# Patient Record
Sex: FEMALE | Race: WHITE | NOT HISPANIC OR LATINO | Employment: UNEMPLOYED | URBAN - METROPOLITAN AREA
[De-identification: names, ages, dates, MRNs, and addresses within clinical notes are randomized per-mention and may not be internally consistent; named-entity substitution may affect disease eponyms.]

---

## 2017-07-20 ENCOUNTER — OFFICE VISIT (OUTPATIENT)
Dept: RHEUMATOLOGY | Facility: CLINIC | Age: 47
End: 2017-07-20
Payer: MEDICAID

## 2017-07-20 VITALS
WEIGHT: 225 LBS | DIASTOLIC BLOOD PRESSURE: 71 MMHG | SYSTOLIC BLOOD PRESSURE: 122 MMHG | BODY MASS INDEX: 44.17 KG/M2 | HEIGHT: 60 IN

## 2017-07-20 DIAGNOSIS — M79.7 FIBROMYALGIA: Chronic | ICD-10-CM

## 2017-07-20 PROCEDURE — 99214 OFFICE O/P EST MOD 30 MIN: CPT | Mod: ,,, | Performed by: INTERNAL MEDICINE

## 2017-07-20 RX ORDER — ZALEPLON 5 MG/1
CAPSULE ORAL
COMMUNITY
End: 2017-07-20

## 2017-07-20 RX ORDER — SIMVASTATIN 20 MG/1
TABLET, FILM COATED ORAL
COMMUNITY
End: 2021-05-05 | Stop reason: DRUGHIGH

## 2017-07-20 RX ORDER — FENOFIBRATE 200 MG/1
CAPSULE ORAL
COMMUNITY
End: 2017-11-28

## 2017-07-20 RX ORDER — INSULIN GLARGINE 300 [IU]/ML
INJECTION, SOLUTION SUBCUTANEOUS
COMMUNITY

## 2017-07-20 NOTE — PATIENT INSTRUCTIONS
4 Steps for Eating Healthier  Changing the way you eat can improve your health. It can lower your cholesterol and blood pressure, and help you stay at a healthy weight. Your diet doesnt have to be bland and boring to be healthy. Just watch your calories and follow these steps:    1. Eat fewer unhealthy fats  · Choose more fish and lean meats instead of fatty cuts of meat.  · Skip butter and lard, and use less margarine.  · Pass on foods that have palm, coconut, or hydrogenated oils.  · Eat fewer high-fat dairy foods like cheese, ice cream, and whole milk.  · Get a heart-healthy cookbook and try some low-fat recipes.  2. Go light on salt  · Keep the saltshaker off the table.  · Limit high-salt ingredients, such as soy sauce, bouillon, and garlic salt.  · Instead of adding salt when cooking, season your food with herbs and flavorings. Try lemon, garlic, and onion.  · Limit convenience foods, such as boxed or canned foods and restaurant food.  · Read food labels and choose lower-sodium options.  3. Limit sugar  · Pause before you add sugars to pancakes, cereal, coffee, or tea. This includes white and brown table sugar, syrup, honey, and molasses. Cut your usual amount by half.  · Use non-sugar sweeteners. Stevia, aspartame, and sucralose can satisfy a sweet tooth without adding calories.  · Swap out sugar-filled soda and other drinks. Buy sugar-free or low-calorie beverages. Remember water is always the best choice.  · Read labels and choose foods with less added sugar. Keep in mind that dairy foods and foods with fruit will have some natural sugar.  · Cut the sugar in recipes by 1/3 to 1/2. Boost the flavor with extracts like almond, vanilla, or orange. Or add spices such as cinnamon or nutmeg.  4. Eat more fiber  · Eat fresh fruits and vegetables every day.  · Boost your diet with whole grains. Go for oats, whole-grain rice, and bran.  · Add beans and lentils to your meals.  · Drink more water to match your fiber  increase. This is to help prevent constipation.  Date Last Reviewed: 5/11/2015  © 9095-3861 [email or web address] [address] [place] [address], [place] [address]. All rights reserved. This information is not intended as a substitute for professional medical care. Always follow your healthcare professional's instructions.

## 2017-07-20 NOTE — PROGRESS NOTES
[facility] RHEUMATOLOGY           Follow-up visit      Subjective:       Patient ID:   NAME: Arminda Cooper : 1970     47 y.o. female    Referring Doc: No ref. provider found  Other Physicians:    Chief Complaint:  Fibromyalgia (follow-up-- pt has had migraine X2 weeks,) and Pain (left pain knee, lower back pain, pt.had shooting pain under rib cage that was new)      HPI/Interval History:    The patient notes little change from her prior visit. She has migratory myalgias and arthralgias, most recently central low back pain and some left knee pain without antecedent trauma and without swelling or redness. She takes her sleep medication only occasionallyand admits that she does not feel well the morning after she does not take it. I noted that her weight had increased again and we therefore went into considerable depth on diet and exercise.          ROS:   GEN: no fever, night sweats or weight loss  SKIN:  no rashes , erythema, bruising, or swelling, no Raynauds, no photosensitivity  HEENT: no HAs, no changes in vision, no mouth ulcers, no sicca symptoms, no scalp tenderness, jaw claudication.  CV: no CP, SOB, PND, TORRES or orthopnea,no palpitations  PULM: no SOB, cough, hemoptysis, sputum or pleuritic pain  GI: no abdominal pain, nausea, vomiting, constipation, diarrhea, melanotic stools, BRBPR, or hematemesis.no dysphagia  : no hematuria, dysuria  NEURO:no paresthesias, headaches, visual disturbances, muscle weakness  MUSCULOSKELETAL:  Low back pain and intermittent left knee pain without history of joint swelling.  PSYCH: + insomnia, no significant depression, no anxiety    Medications:    Current Outpatient Prescriptions:     duloxetine (CYMBALTA) 30 MG capsule, Take 60 mg by mouth once daily. , Disp: , Rfl:     fenofibrate micronized (LOFIBRA) 200 MG Cap, Take by mouth., Disp: , Rfl:     glimepiride (AMARYL) 2 MG tablet, Take 2 mg by mouth before breakfast., Disp: , Rfl:     ibuprofen (ADVIL,MOTRIN)  800 MG tablet, Take 1 tablet (800 mg total) by mouth 3 (three) times daily as needed., Disp: 20 tablet, Rfl: 0    insulin glargine, TOUJEO, (TOUJEO SOLOSTAR) 300 unit/mL (1.5 mL) InPn pen, Inject into the skin., Disp: , Rfl:     levothyroxine (SYNTHROID) 75 MCG tablet, Take 115 mcg by mouth once daily. , Disp: , Rfl:     LIRAGLUTIDE (VICTOZA 2-SOFÍA SUBQ), Inject 1.2 mg into the skin once daily., Disp: , Rfl:     prednisoLONE acetate (PRED FORTE) 1 % DrpS, 1 drop 4 (four) times daily., Disp: , Rfl:     simvastatin (ZOCOR) 20 MG tablet, Take by mouth., Disp: , Rfl:   FAMILY HISTORY: negative for Connective Tissue Disease        Review of patient's allergies indicates:  No Known Allergies          Objective:     Vitals:  Blood pressure 122/71, height 5' (1.524 m), weight 102.1 kg (225 lb).    Physical Examination:   GEN: wn/wd female in no apparent distress; AAOx3  SKIN: no rashes, no lesions, no sclerodactyly, no Raynaud's, no periungual erythema  HEAD: no alopecia, no scalp tenderness, no temporal artery tenderness or induration.  EYES: no pallor, no icterus, PERRLA  ENT:  no thrush, no mucosal dryness or ulcerations, adequate oral hygiene & dentition.  NECK: supple x 6, no masses, no thyromegaly, no lymphadenopathy.  CV:   S1 and S2 regular, no murmurs, gallop or rubs  CHEST: Normal respiratory effort;  normal breath sounds/no adventitious sounds. No signs of consolidation.  ABD: non-tender and non-distended; soft; normal bowel sounds; no rebound/guarding or tenderness. No hepatosplenomegaly.  Musculoskeletal:  no evidence of any significant degenerative arthritis or deformity.  EXTREM: no clubbing, cyanosis or edema. normal pulses.  NEURO: grossly intact; motor/sensory WNL; AAOx3; no tremors  PSYCH: normal mood, affect and behavior            Labs:   Lab Results   Component Value Date    WBC 10.80 06/24/2015    HGB 14.6 06/24/2015    HCT 43.3 06/24/2015    MCV 93 06/24/2015     06/24/2015   CMP@  Sodium    Date Value Ref Range Status   06/24/2015 139 136 - 145 mmol/L Final     Potassium   Date Value Ref Range Status   06/24/2015 3.8 3.5 - 5.1 mmol/L Final     Chloride   Date Value Ref Range Status   06/24/2015 106 95 - 110 mmol/L Final     CO2   Date Value Ref Range Status   06/24/2015 24 23 - 29 mmol/L Final     Glucose   Date Value Ref Range Status   06/24/2015 186 (H) 70 - 110 mg/dL Final     BUN, Bld   Date Value Ref Range Status   06/24/2015 8 6 - 20 mg/dL Final     Creatinine   Date Value Ref Range Status   06/24/2015 0.7 0.5 - 1.4 mg/dL Final     Calcium   Date Value Ref Range Status   06/24/2015 9.4 8.7 - 10.5 mg/dL Final     Total Protein   Date Value Ref Range Status   06/24/2015 7.0 6.0 - 8.4 g/dL Final     Albumin   Date Value Ref Range Status   06/24/2015 3.6 3.5 - 5.2 g/dL Final     Total Bilirubin   Date Value Ref Range Status   06/24/2015 0.4 0.1 - 1.0 mg/dL Final     Comment:     For infants and newborns, interpretation of results should be based  on gestational age, weight and in agreement with clinical  observations.  Premature Infant recommended reference ranges:  Up to 24 hours.............<8.0 mg/dL  Up to 48 hours............<12.0 mg/dL  3-5 days..................<15.0 mg/dL  6-29 days.................<15.0 mg/dL       Alkaline Phosphatase   Date Value Ref Range Status   06/24/2015 57 55 - 135 U/L Final     AST   Date Value Ref Range Status   06/24/2015 19 10 - 40 U/L Final     ALT   Date Value Ref Range Status   06/24/2015 15 10 - 44 U/L Final         Radiology/Diagnostic Studies:    No x-rays were available to me.    Assessment/Discussion/Plan:   47 y.o. female with Fibromyalgia aggravated by intermittent noncompliance, morbid obesity and deconditioning.        PLAN:  We will continue her medication without change. She is to use her Sonata every night. We went into great depth on diet and weight loss along with exercise. I have discussed with her how to count calories and instructed her to  limit her caloric intake to 1200 ashely per day. In addition, I have asked her to begin some low impact exercise 5 minutes daily, 5 days per week I suggested a stationary bicycle. She is then to increase the duration of her workouts by 50% each month. I have asked her that she shoot for a 4 ounce weekly weight loss or 1 pound monthly. This of course translates to 12 pounds per year and in 5 years, 60 pounds! I made it known that her weight at the next visit will be important to me.      RTC: I will see her back in 3-4 months.      Electronically signed by Rashi Eng MD

## 2017-11-28 ENCOUNTER — OFFICE VISIT (OUTPATIENT)
Dept: RHEUMATOLOGY | Facility: CLINIC | Age: 47
End: 2017-11-28
Payer: MEDICAID

## 2017-11-28 VITALS
WEIGHT: 217 LBS | DIASTOLIC BLOOD PRESSURE: 78 MMHG | HEIGHT: 60 IN | SYSTOLIC BLOOD PRESSURE: 128 MMHG | BODY MASS INDEX: 42.6 KG/M2

## 2017-11-28 DIAGNOSIS — M79.7 FIBROMYALGIA: Primary | ICD-10-CM

## 2017-11-28 PROCEDURE — 99212 OFFICE O/P EST SF 10 MIN: CPT | Mod: ,,, | Performed by: INTERNAL MEDICINE

## 2017-11-28 RX ORDER — DULOXETIN HYDROCHLORIDE 30 MG/1
30 CAPSULE, DELAYED RELEASE ORAL DAILY
Qty: 90 CAPSULE | Refills: 1 | Status: SHIPPED | OUTPATIENT
Start: 2017-11-28 | End: 2018-06-05 | Stop reason: SDUPTHER

## 2017-11-28 NOTE — PROGRESS NOTES
[facility] RHEUMATOLOGY           Follow-up visit      Subjective:       Patient ID:   NAME: Arminda Cooper : 1970     47 y.o. female    Referring Doc: No ref. provider found  Other Physicians:    Chief Complaint:  No chief complaint on file.      HPI/Interval History:    The patient is doing well. She has been exercising and has lost weight. She is sleeping well without medication.          ROS:   GEN:    no fever, night sweats or weight loss  SKIN:   no rashes , erythema, bruising, or swelling, no Raynauds, no photosensitivity  HEENT: no HAs, no changes in vision, no mouth ulcers, no sicca symptoms, no scalp tenderness, jaw claudication.  CV:      no CP, SOB, PND, TORRES or orthopnea,no palpitations  PULM: no SOB, cough, hemoptysis, sputum or pleuritic pain  GI:      no abdominal pain, nausea, vomiting, constipation, diarrhea, melanotic stools, BRBPR, or hematemesis, no dysphagia, no GERD  :     no hematuria, dysuria  NEURO: no paresthesias, headaches, visual disturbances  MUSCULOSKELETAL:  no muscle or joint complaints  PSYCH:   No insomnia, no significant depression, no anxiety    Medications:    Current Outpatient Prescriptions:     DULoxetine (CYMBALTA) 30 MG capsule, Take 1 capsule (30 mg total) by mouth once daily., Disp: 90 capsule, Rfl: 1    glimepiride (AMARYL) 2 MG tablet, Take 2 mg by mouth before breakfast., Disp: , Rfl:     insulin glargine, TOUJEO, (TOUJEO SOLOSTAR) 300 unit/mL (1.5 mL) InPn pen, Inject into the skin., Disp: , Rfl:     levothyroxine (SYNTHROID) 75 MCG tablet, Take 115 mcg by mouth once daily. , Disp: , Rfl:     LIRAGLUTIDE (VICTOZA 2-SOFÍA SUBQ), Inject 1.2 mg into the skin once daily., Disp: , Rfl:     simvastatin (ZOCOR) 20 MG tablet, Take by mouth., Disp: , Rfl:       FAMILY HISTORY: negative for Connective Tissue Disease        Review of patient's allergies indicates:  No Known Allergies          Objective:     Vitals:  Blood pressure 128/78, height 5' (1.524 m),  weight 98.4 kg (217 lb).    Physical Examination:   GEN: wn/wd female in no apparent distress  SKIN: no rashes, no lesions, no sclerodactyly, no Raynaud's, no periungual erythema  HEAD: no alopecia, no scalp tenderness, no temporal artery tenderness or induration.  EYES: no pallor, no icterus, PERRLA  ENT:  no thrush, no mucosal dryness or ulcerations, adequate oral hygiene & dentition.  NECK: supple x 6, no masses, no thyromegaly, no lymphadenopathy.  CV:   S1 and S2 regular, no murmurs, gallop or rubs  CHEST: Normal respiratory effort;  normal breath sounds/no adventitious sounds. No signs of consolidation.  ABD: non-tender and non-distended; soft; normal bowel sounds; no rebound/guarding or tenderness. No hepatosplenomegaly.  Musculoskeletal:  No evidence of progressive arthritis  EXTREM: no clubbing, cyanosis or edema. normal pulses.  NEURO: grossly intact; motor/sensory WNL; AAOx3; no tremors  PSYCH:  normal mood, affect and behavior            Labs:   Lab Results   Component Value Date    WBC 10.80 06/24/2015    HGB 14.6 06/24/2015    HCT 43.3 06/24/2015    MCV 93 06/24/2015     06/24/2015   CMP@  Sodium   Date Value Ref Range Status   06/24/2015 139 136 - 145 mmol/L Final     Potassium   Date Value Ref Range Status   06/24/2015 3.8 3.5 - 5.1 mmol/L Final     Chloride   Date Value Ref Range Status   06/24/2015 106 95 - 110 mmol/L Final     CO2   Date Value Ref Range Status   06/24/2015 24 23 - 29 mmol/L Final     Glucose   Date Value Ref Range Status   06/24/2015 186 (H) 70 - 110 mg/dL Final     BUN, Bld   Date Value Ref Range Status   06/24/2015 8 6 - 20 mg/dL Final     Creatinine   Date Value Ref Range Status   06/24/2015 0.7 0.5 - 1.4 mg/dL Final     Calcium   Date Value Ref Range Status   06/24/2015 9.4 8.7 - 10.5 mg/dL Final     Total Protein   Date Value Ref Range Status   06/24/2015 7.0 6.0 - 8.4 g/dL Final     Albumin   Date Value Ref Range Status   06/24/2015 3.6 3.5 - 5.2 g/dL Final     Total  Bilirubin   Date Value Ref Range Status   06/24/2015 0.4 0.1 - 1.0 mg/dL Final     Comment:     For infants and newborns, interpretation of results should be based  on gestational age, weight and in agreement with clinical  observations.  Premature Infant recommended reference ranges:  Up to 24 hours.............<8.0 mg/dL  Up to 48 hours............<12.0 mg/dL  3-5 days..................<15.0 mg/dL  6-29 days.................<15.0 mg/dL       Alkaline Phosphatase   Date Value Ref Range Status   06/24/2015 57 55 - 135 U/L Final     AST   Date Value Ref Range Status   06/24/2015 19 10 - 40 U/L Final     ALT   Date Value Ref Range Status   06/24/2015 15 10 - 44 U/L Final         Radiology/Diagnostic Studies:    none    Assessment/Discussion/Plan:   47 y.o. female with fibromyalgia-well controlled on Cymbalta 30 mg alone.      PLAN:  I will continue her medication without change. No blood testing is required today.      RTC:  I will see her back in 4 months.      Electronically signed by Rashi Eng MD

## 2018-04-16 ENCOUNTER — OFFICE VISIT (OUTPATIENT)
Dept: RHEUMATOLOGY | Facility: CLINIC | Age: 48
End: 2018-04-16
Payer: MEDICAID

## 2018-04-16 VITALS
WEIGHT: 220.88 LBS | DIASTOLIC BLOOD PRESSURE: 82 MMHG | BODY MASS INDEX: 43.14 KG/M2 | SYSTOLIC BLOOD PRESSURE: 122 MMHG

## 2018-04-16 DIAGNOSIS — M79.7 FIBROMYALGIA: Primary | ICD-10-CM

## 2018-04-16 PROCEDURE — 99212 OFFICE O/P EST SF 10 MIN: CPT | Mod: ,,, | Performed by: INTERNAL MEDICINE

## 2018-04-16 NOTE — PROGRESS NOTES
[facility] RHEUMATOLOGY           Follow-up visit      Subjective:       Patient ID:   NAME: Arminda Cooper : 1970     47 y.o. female    Referring Doc: No ref. provider found  Other Physicians:    Chief Complaint:  Fibromyalgia (Takes Cymbalta 30mg QD, Left Hip is bothering her today, Right Shoulder stiff)      HPI/Interval History:   The patient has been doing better. She has been walking regularly. Her energy is improved. She is most often sleeping well.          ROS:   GEN:    no fever, night sweats or weight loss  SKIN:   no rashes , erythema, bruising, or swelling, no Raynauds, no photosensitivity  HEENT: no HAs, no changes in vision, no mouth ulcers, no sicca symptoms, no scalp tenderness, jaw claudication.  CV:      no CP, SOB, PND, TORRES or orthopnea,no palpitations  PULM: no SOB, cough, hemoptysis, sputum or pleuritic pain  GI:      no abdominal pain, nausea, vomiting, constipation, diarrhea, melanotic stools, BRBPR, or hematemesis, no dysphagia, no GERD  :     no hematuria, dysuria  NEURO: no paresthesias, headaches, visual disturbances  MUSCULOSKELETAL:  No muscle or joint complaints  PSYCH:   No insomnia, no significant depression, no anxiety    Medications:    Current Outpatient Prescriptions:     DULoxetine (CYMBALTA) 30 MG capsule, Take 1 capsule (30 mg total) by mouth once daily., Disp: 90 capsule, Rfl: 1    glimepiride (AMARYL) 2 MG tablet, Take 2 mg by mouth before breakfast., Disp: , Rfl:     insulin glargine, TOUJEO, (TOUJEO SOLOSTAR) 300 unit/mL (1.5 mL) InPn pen, Inject into the skin., Disp: , Rfl:     levothyroxine (SYNTHROID) 75 MCG tablet, Take 115 mcg by mouth once daily. , Disp: , Rfl:     LIRAGLUTIDE (VICTOZA 2-SOFÍA SUBQ), Inject 1.2 mg into the skin once daily., Disp: , Rfl:     simvastatin (ZOCOR) 20 MG tablet, Take by mouth., Disp: , Rfl:       FAMILY HISTORY: negative for Connective Tissue Disease        Review of patient's allergies indicates:  No Known  Allergies          Objective:     Vitals:  Blood pressure 122/82, weight 100.2 kg (220 lb 14.4 oz).    Physical Examination:   GEN: wn/wd female in no apparent distress  SKIN: no rashes, no lesions, no sclerodactyly, no Raynaud's, no periungual erythema  HEAD: no alopecia, no scalp tenderness, no temporal artery tenderness or induration.  EYES: no pallor, no icterus, PERRLA  ENT:  no thrush, no mucosal dryness or ulcerations, adequate oral hygiene & dentition.  NECK: supple x 6, no masses, no thyromegaly, no lymphadenopathy.  CV:   S1 and S2 regular, no murmurs, gallop or rubs  CHEST: Normal respiratory effort;  normal breath sounds/no adventitious sounds. No signs of consolidation.  ABD: non-tender and non-distended; soft; normal bowel sounds; no rebound/guarding or tenderness. No hepatosplenomegaly.  Musculoskeletal:  No evidence of inflammatory disease or a progressive deformity  EXTREM: no clubbing, cyanosis or edema. normal pulses.  NEURO: grossly intact; motor/sensory WNL; AAOx3; no tremors  PSYCH:  normal mood, affect and behavior            Labs:   Lab Results   Component Value Date    WBC 10.80 06/24/2015    HGB 14.6 06/24/2015    HCT 43.3 06/24/2015    MCV 93 06/24/2015     06/24/2015   CMP@  Sodium   Date Value Ref Range Status   06/24/2015 139 136 - 145 mmol/L Final     Potassium   Date Value Ref Range Status   06/24/2015 3.8 3.5 - 5.1 mmol/L Final     Chloride   Date Value Ref Range Status   06/24/2015 106 95 - 110 mmol/L Final     CO2   Date Value Ref Range Status   06/24/2015 24 23 - 29 mmol/L Final     Glucose   Date Value Ref Range Status   06/24/2015 186 (H) 70 - 110 mg/dL Final     BUN, Bld   Date Value Ref Range Status   06/24/2015 8 6 - 20 mg/dL Final     Creatinine   Date Value Ref Range Status   06/24/2015 0.7 0.5 - 1.4 mg/dL Final     Calcium   Date Value Ref Range Status   06/24/2015 9.4 8.7 - 10.5 mg/dL Final     Total Protein   Date Value Ref Range Status   06/24/2015 7.0 6.0 - 8.4  g/dL Final     Albumin   Date Value Ref Range Status   06/24/2015 3.6 3.5 - 5.2 g/dL Final     Total Bilirubin   Date Value Ref Range Status   06/24/2015 0.4 0.1 - 1.0 mg/dL Final     Comment:     For infants and newborns, interpretation of results should be based  on gestational age, weight and in agreement with clinical  observations.  Premature Infant recommended reference ranges:  Up to 24 hours.............<8.0 mg/dL  Up to 48 hours............<12.0 mg/dL  3-5 days..................<15.0 mg/dL  6-29 days.................<15.0 mg/dL       Alkaline Phosphatase   Date Value Ref Range Status   06/24/2015 57 55 - 135 U/L Final     AST   Date Value Ref Range Status   06/24/2015 19 10 - 40 U/L Final     ALT   Date Value Ref Range Status   06/24/2015 15 10 - 44 U/L Final         Radiology/Diagnostic Studies:    none    Assessment/Discussion/Plan:   47 y.o. female with fibromyalgia-stable      PLAN:  She will continue her medication without change. No blood testing was required today.      RTC:  I will see her back in 3-4 months.      Electronically signed by Rashi Eng MD

## 2018-05-16 ENCOUNTER — OFFICE VISIT (OUTPATIENT)
Dept: RHEUMATOLOGY | Facility: CLINIC | Age: 48
End: 2018-05-16
Payer: MEDICAID

## 2018-05-16 VITALS
DIASTOLIC BLOOD PRESSURE: 79 MMHG | BODY MASS INDEX: 43.71 KG/M2 | SYSTOLIC BLOOD PRESSURE: 124 MMHG | WEIGHT: 223.81 LBS

## 2018-05-16 DIAGNOSIS — M79.7 FIBROMYALGIA: Primary | ICD-10-CM

## 2018-05-16 PROCEDURE — 99212 OFFICE O/P EST SF 10 MIN: CPT | Mod: ,,, | Performed by: INTERNAL MEDICINE

## 2018-05-16 NOTE — PROGRESS NOTES
[facility] RHEUMATOLOGY           Follow-up visit    Notes dictated via Dragon to EPIC. Please forgive any unintentional errors.  Subjective:       Patient ID:   NAME: Arminda Cooper : 1970     47 y.o. female    Referring Doc: No ref. provider found  Other Physicians:    Chief Complaint:  Fibromyalgia      HPI/Interval History:  The patient is doing well. Her moods are much improved and she credits the Cymbalta for that. She sleeps relatively well, especially on nights that she takes melatonin.  She is not exercising as aggressively as I would like but she states that she is making an effort.        ROS:   GEN:    no fever, night sweats or weight loss  SKIN:   no rashes , erythema, bruising, or swelling, no Raynauds, no photosensitivity  HEENT: no changes in vision, no mouth ulcers, no sicca symptoms, no scalp tenderness, no jaw claudication.  CV:      no CP, PND, TORRES or orthopnea, no palpitations  PULM: no SOB, cough, hemoptysis, sputum or pleuritic pain  GI:       no abdominal pain, nausea, vomiting, constipation, diarrhea, melanotic stools, BRBPR, or hematemesis, no dysphagia, no GERD  :     no hematuria, dysuria  NEURO: no paresthesias, headaches, acute visual disturbances  MUSCULOSKELETAL:  Pain in the right trapezius only  PSYCH:   No insomnia, no significant anxiety or depression    Medications:    Current Outpatient Prescriptions:     DULoxetine (CYMBALTA) 30 MG capsule, Take 1 capsule (30 mg total) by mouth once daily., Disp: 90 capsule, Rfl: 1    glimepiride (AMARYL) 2 MG tablet, Take 2 mg by mouth before breakfast., Disp: , Rfl:     insulin glargine, TOUJEO, (TOUJEO SOLOSTAR) 300 unit/mL (1.5 mL) InPn pen, Inject into the skin., Disp: , Rfl:     levothyroxine (SYNTHROID) 75 MCG tablet, Take 115 mcg by mouth once daily. , Disp: , Rfl:     LIRAGLUTIDE (VICTOZA 2-SOFÍA SUBQ), Inject 1.2 mg into the skin once daily., Disp: , Rfl:     simvastatin (ZOCOR) 20 MG tablet, Take by mouth., Disp: ,  Rfl:       FAMILY HISTORY: negative for Connective Tissue Disease        Review of patient's allergies indicates:   Allergen Reactions    Augmentin [amoxicillin-pot clavulanate] Rash             Objective:     Vitals:  Blood pressure 124/79, weight 101.5 kg (223 lb 12.8 oz).    Physical Examination:   GEN: wn/wd female in no apparent distress  SKIN: no rashes, no lesions, no sclerodactyly, no Raynaud's, no periungual erythema  HEAD: no alopecia, no scalp tenderness, no temporal artery tenderness or induration.  EYES: no pallor, no icterus, PERRLA  ENT:  no thrush, no mucosal dryness or ulcerations, adequate oral hygiene & dentition.  NECK:  Moderate spasm of the right trapezius with tenderness at the occipital insertion, no masses, no thyromegaly, no lymphadenopathy.  CV:   S1 and S2 regular, no murmurs, gallop or rubs  CHEST: Normal respiratory effort;  normal breath sounds/no adventitious sounds. No signs of consolidation.  ABD: non-tender and non-distended; soft; normal bowel sounds; no rebound/guarding or tenderness. No hepatosplenomegaly.  Musculoskeletal:  No evidence of active inflammatory disease or of any progression of deformity  EXTREM: no clubbing, cyanosis or edema. normal pulses.  NEURO: grossly intact; motor/sensory WNL; no tremors  PSYCH:  normal mood, affect and behavior            Labs:   Lab Results   Component Value Date    WBC 10.80 06/24/2015    HGB 14.6 06/24/2015    HCT 43.3 06/24/2015    MCV 93 06/24/2015     06/24/2015   CMP@  Sodium   Date Value Ref Range Status   06/24/2015 139 136 - 145 mmol/L Final     Potassium   Date Value Ref Range Status   06/24/2015 3.8 3.5 - 5.1 mmol/L Final     Chloride   Date Value Ref Range Status   06/24/2015 106 95 - 110 mmol/L Final     CO2   Date Value Ref Range Status   06/24/2015 24 23 - 29 mmol/L Final     Glucose   Date Value Ref Range Status   06/24/2015 186 (H) 70 - 110 mg/dL Final     BUN, Bld   Date Value Ref Range Status   06/24/2015 8 6 -  20 mg/dL Final     Creatinine   Date Value Ref Range Status   06/24/2015 0.7 0.5 - 1.4 mg/dL Final     Calcium   Date Value Ref Range Status   06/24/2015 9.4 8.7 - 10.5 mg/dL Final     Total Protein   Date Value Ref Range Status   06/24/2015 7.0 6.0 - 8.4 g/dL Final     Albumin   Date Value Ref Range Status   06/24/2015 3.6 3.5 - 5.2 g/dL Final     Total Bilirubin   Date Value Ref Range Status   06/24/2015 0.4 0.1 - 1.0 mg/dL Final     Comment:     For infants and newborns, interpretation of results should be based  on gestational age, weight and in agreement with clinical  observations.  Premature Infant recommended reference ranges:  Up to 24 hours.............<8.0 mg/dL  Up to 48 hours............<12.0 mg/dL  3-5 days..................<15.0 mg/dL  6-29 days.................<15.0 mg/dL       Alkaline Phosphatase   Date Value Ref Range Status   06/24/2015 57 55 - 135 U/L Final     AST   Date Value Ref Range Status   06/24/2015 19 10 - 40 U/L Final     ALT   Date Value Ref Range Status   06/24/2015 15 10 - 44 U/L Final         Radiology/Diagnostic Studies:    none    Assessment/Discussion/Plan:   47 y.o. female with fibromyalgia-stable on Cymbalta 30 mg      PLAN:  I will continue her medication without change. I have encouraged her to become more active in exercising. Weight loss is also in need which I will address at our next appointment      RTC:  I will see her back in 3-4 months      Electronically signed by Rashi Eng MD

## 2018-06-05 DIAGNOSIS — M79.7 FIBROMYALGIA: ICD-10-CM

## 2018-06-05 RX ORDER — DULOXETIN HYDROCHLORIDE 30 MG/1
CAPSULE, DELAYED RELEASE ORAL
Qty: 30 CAPSULE | Refills: 5 | Status: SHIPPED | OUTPATIENT
Start: 2018-06-05 | End: 2018-10-02

## 2018-10-02 ENCOUNTER — OFFICE VISIT (OUTPATIENT)
Dept: RHEUMATOLOGY | Facility: CLINIC | Age: 48
End: 2018-10-02
Payer: MEDICAID

## 2018-10-02 VITALS
SYSTOLIC BLOOD PRESSURE: 120 MMHG | HEART RATE: 108 BPM | WEIGHT: 221.81 LBS | BODY MASS INDEX: 43.32 KG/M2 | DIASTOLIC BLOOD PRESSURE: 80 MMHG

## 2018-10-02 DIAGNOSIS — E13.8 DIABETES MELLITUS OF OTHER TYPE WITH COMPLICATION, UNSPECIFIED WHETHER LONG TERM INSULIN USE: ICD-10-CM

## 2018-10-02 DIAGNOSIS — Z79.899 ENCOUNTER FOR LONG-TERM (CURRENT) DRUG USE: ICD-10-CM

## 2018-10-02 DIAGNOSIS — E03.9 HYPOTHYROIDISM, UNSPECIFIED TYPE: ICD-10-CM

## 2018-10-02 DIAGNOSIS — M79.7 FIBROMYALGIA: Primary | ICD-10-CM

## 2018-10-02 DIAGNOSIS — E78.5 HYPERLIPIDEMIA, UNSPECIFIED HYPERLIPIDEMIA TYPE: ICD-10-CM

## 2018-10-02 PROCEDURE — 99214 OFFICE O/P EST MOD 30 MIN: CPT | Mod: ,,, | Performed by: INTERNAL MEDICINE

## 2018-10-02 RX ORDER — DULOXETIN HYDROCHLORIDE 60 MG/1
60 CAPSULE, DELAYED RELEASE ORAL DAILY
Qty: 30 CAPSULE | Refills: 11 | Status: SHIPPED | OUTPATIENT
Start: 2018-10-02 | End: 2019-11-05 | Stop reason: SDUPTHER

## 2018-10-02 NOTE — PROGRESS NOTES
[facility] RHEUMATOLOGY           Follow-up visit    Notes dictated via Dragon to EPIC. Please forgive any unintentional errors.  Subjective:       Patient ID:   NAME: Arminda Cooper : 1970     48 y.o. female    Referring Doc: No ref. provider found  Other Physicians:    Chief Complaint:  Fibromyalgia (Takes Cymbalta 30mg QD(Needs Refill). Upper Back and Shoulders Pain)      HPI/Interval History:   The patient is having increased pain in the shoulders and upper back. She attributes this to her new exercise routine. She notes that she is not sleeping well. She is not taking sleep medication I have given her. She is uncertain why she isn't taking it but she does have it at home and states that she simply forgot. She also relates to daytime sleepiness which typically takes in around noon and last throughout the afternoon          ROS:   GEN:    no fever, night sweats or weight loss  SKIN:   no rashes , erythema, bruising, or swelling, no Raynauds, no photosensitivity  HEENT: no changes in vision, no mouth ulcers, no sicca symptoms, no scalp tenderness, no jaw claudication.  CV:      no CP, PND, TORRES or orthopnea, no palpitations  PULM: no SOB, cough, hemoptysis, sputum or pleuritic pain  GI:       no abdominal pain, nausea, vomiting, constipation, diarrhea, melanotic stools, BRBPR, or hematemesis, no dysphagia, no GERD  :     no hematuria, dysuria  NEURO: no paresthesias, headaches, acute visual disturbances  MUSCULOSKELETAL:  Myalgias of the shoulders and neck  PSYCH:   + insomnia, + anxiety > depression    Medications:    Current Outpatient Medications:     glimepiride (AMARYL) 2 MG tablet, Take 2 mg by mouth before breakfast., Disp: , Rfl:     insulin glargine, TOUJEO, (TOUJEO SOLOSTAR) 300 unit/mL (1.5 mL) InPn pen, Inject into the skin., Disp: , Rfl:     levothyroxine (SYNTHROID) 75 MCG tablet, Take 115 mcg by mouth once daily. , Disp: , Rfl:     LIRAGLUTIDE (VICTOZA 2-SOFÍA SUBQ), Inject 1.2 mg into  the skin once daily., Disp: , Rfl:     simvastatin (ZOCOR) 20 MG tablet, Take by mouth., Disp: , Rfl:     DULoxetine (CYMBALTA) 60 MG capsule, Take 1 capsule (60 mg total) by mouth once daily., Disp: 30 capsule, Rfl: 11      FAMILY HISTORY: negative for Connective Tissue Disease        Review of patient's allergies indicates:   Allergen Reactions    Augmentin [amoxicillin-pot clavulanate] Rash             Objective:     Vitals:  Blood pressure 120/80, pulse 108, weight 100.6 kg (221 lb 12.8 oz).    Physical Examination:   GEN: wn/wd female in no apparent distress  SKIN: no rashes, no lesions, no sclerodactyly, no Raynaud's, no periungual erythema  HEAD: no alopecia, no scalp tenderness, no temporal artery tenderness or induration.  EYES: no pallor, no icterus, PERRLA  ENT:  no thrush, no mucosal dryness or ulcerations, adequate oral hygiene & dentition.  NECK: supple x 6, no masses, no thyromegaly, no lymphadenopathy.  CV:   S1 and S2 regular, no murmurs, gallop or rubs  CHEST: Normal respiratory effort;  normal breath sounds/no adventitious sounds. No signs of consolidation.  ABD: non-tender and non-distended; soft; normal bowel sounds; no rebound/guarding or tenderness. No hepatosplenomegaly.  Musculoskeletal:  No evidence of inflammatory arthritis. No progressive deformity  EXTREM: no clubbing, cyanosis or edema. normal pulses.  NEURO: grossly intact; motor/sensory WNL; no tremors  PSYCH:  normal mood, affect and behavior            Labs:   Lab Results   Component Value Date    WBC 10.80 06/24/2015    HGB 14.6 06/24/2015    HCT 43.3 06/24/2015    MCV 93 06/24/2015     06/24/2015   CMP@  Sodium   Date Value Ref Range Status   06/24/2015 139 136 - 145 mmol/L Final     Potassium   Date Value Ref Range Status   06/24/2015 3.8 3.5 - 5.1 mmol/L Final     Chloride   Date Value Ref Range Status   06/24/2015 106 95 - 110 mmol/L Final     CO2   Date Value Ref Range Status   06/24/2015 24 23 - 29 mmol/L Final      Glucose   Date Value Ref Range Status   06/24/2015 186 (H) 70 - 110 mg/dL Final     BUN, Bld   Date Value Ref Range Status   06/24/2015 8 6 - 20 mg/dL Final     Creatinine   Date Value Ref Range Status   06/24/2015 0.7 0.5 - 1.4 mg/dL Final     Calcium   Date Value Ref Range Status   06/24/2015 9.4 8.7 - 10.5 mg/dL Final     Total Protein   Date Value Ref Range Status   06/24/2015 7.0 6.0 - 8.4 g/dL Final     Albumin   Date Value Ref Range Status   06/24/2015 3.6 3.5 - 5.2 g/dL Final     Total Bilirubin   Date Value Ref Range Status   06/24/2015 0.4 0.1 - 1.0 mg/dL Final     Comment:     For infants and newborns, interpretation of results should be based  on gestational age, weight and in agreement with clinical  observations.  Premature Infant recommended reference ranges:  Up to 24 hours.............<8.0 mg/dL  Up to 48 hours............<12.0 mg/dL  3-5 days..................<15.0 mg/dL  6-29 days.................<15.0 mg/dL       Alkaline Phosphatase   Date Value Ref Range Status   06/24/2015 57 55 - 135 U/L Final     AST   Date Value Ref Range Status   06/24/2015 19 10 - 40 U/L Final     ALT   Date Value Ref Range Status   06/24/2015 15 10 - 44 U/L Final         Radiology/Diagnostic Studies:    none    Assessment/Discussion/Plan:   48 y.o. female with fibromyalgia-inadequate response to only partial therapy  2) diabetes, hyperlipidemia and hypothyroidism- overdue for labs    PLAN:  I'm going to increase her Cymbalta dose to 60 mg each morning. She will get some benefit from that vis-à-vis pain relief and she should get some decrease in fatigue as well. I have encouraged her to continue exercising. I also reminded her about the 1200-calorie diet that we had set up.  I have drawn baseline blood testing for her general medical complaints. These I will forward to Dr. Roberts.    RTC:  I will see her back in 2-3 months.      Electronically signed by Rashi Eng MD

## 2018-10-03 LAB
ALBUMIN SERPL-MCNC: 4.5 G/DL (ref 3.5–5.5)
ALBUMIN/GLOB SERPL: 1.6 {RATIO} (ref 1.2–2.2)
ALP SERPL-CCNC: 54 IU/L (ref 39–117)
ALT SERPL-CCNC: 17 IU/L (ref 0–32)
AST SERPL-CCNC: 21 IU/L (ref 0–40)
BASOPHILS # BLD AUTO: 0 X10E3/UL (ref 0–0.2)
BASOPHILS NFR BLD AUTO: 0 %
BILIRUB SERPL-MCNC: 0.4 MG/DL (ref 0–1.2)
BUN SERPL-MCNC: 10 MG/DL (ref 6–24)
BUN/CREAT SERPL: 15 (ref 9–23)
CALCIUM SERPL-MCNC: 9.5 MG/DL (ref 8.7–10.2)
CHLORIDE SERPL-SCNC: 99 MMOL/L (ref 96–106)
CHOLEST SERPL-MCNC: 224 MG/DL (ref 100–199)
CO2 SERPL-SCNC: 24 MMOL/L (ref 20–29)
CREAT SERPL-MCNC: 0.68 MG/DL (ref 0.57–1)
CRP SERPL-MCNC: 2.4 MG/L (ref 0–4.9)
EGFR IF AFRICAN AMERICAN: 119 ML/MIN/1.73
EOSINOPHIL # BLD AUTO: 0.4 X10E3/UL (ref 0–0.4)
EOSINOPHIL NFR BLD AUTO: 5 %
ERYTHROCYTE [DISTWIDTH] IN BLOOD BY AUTOMATED COUNT: 13.5 % (ref 12.3–15.4)
EST. GFR  (NON AFRICAN AMERICAN): 103 ML/MIN/1.73
GLOBULIN SER CALC-MCNC: 2.8 G/DL (ref 1.5–4.5)
GLUCOSE SERPL-MCNC: 142 MG/DL (ref 65–99)
HBA1C MFR BLD: 7.4 % (ref 4.8–5.6)
HCT VFR BLD AUTO: 43.8 % (ref 34–46.6)
HDLC SERPL-MCNC: 42 MG/DL
HGB BLD-MCNC: 14.8 G/DL (ref 11.1–15.9)
IMM GRANULOCYTES # BLD: 0 X10E3/UL (ref 0–0.1)
IMM GRANULOCYTES NFR BLD: 0 %
LDLC SERPL CALC-MCNC: 132 MG/DL (ref 0–99)
LYMPHOCYTES # BLD AUTO: 2.9 X10E3/UL (ref 0.7–3.1)
LYMPHOCYTES NFR BLD AUTO: 31 %
MCH RBC QN AUTO: 31.3 PG (ref 26.6–33)
MCHC RBC AUTO-ENTMCNC: 33.8 G/DL (ref 31.5–35.7)
MCV RBC AUTO: 93 FL (ref 79–97)
MONOCYTES # BLD AUTO: 0.6 X10E3/UL (ref 0.1–0.9)
MONOCYTES NFR BLD AUTO: 7 %
NEUTROPHILS # BLD AUTO: 5.4 X10E3/UL (ref 1.4–7)
NEUTROPHILS NFR BLD AUTO: 57 %
PLATELET # BLD AUTO: 277 X10E3/UL (ref 150–379)
POTASSIUM SERPL-SCNC: 4.3 MMOL/L (ref 3.5–5.2)
PROT SERPL-MCNC: 7.3 G/DL (ref 6–8.5)
RBC # BLD AUTO: 4.73 X10E6/UL (ref 3.77–5.28)
SODIUM SERPL-SCNC: 141 MMOL/L (ref 134–144)
TRIGL SERPL-MCNC: 248 MG/DL (ref 0–149)
VLDLC SERPL CALC-MCNC: 50 MG/DL (ref 5–40)
WBC # BLD AUTO: 9.3 X10E3/UL (ref 3.4–10.8)

## 2019-01-16 ENCOUNTER — OFFICE VISIT (OUTPATIENT)
Dept: RHEUMATOLOGY | Facility: CLINIC | Age: 49
End: 2019-01-16
Payer: MEDICAID

## 2019-01-16 VITALS — DIASTOLIC BLOOD PRESSURE: 82 MMHG | WEIGHT: 220.19 LBS | SYSTOLIC BLOOD PRESSURE: 115 MMHG | BODY MASS INDEX: 43 KG/M2

## 2019-01-16 DIAGNOSIS — E66.9 OBESITY, UNSPECIFIED CLASSIFICATION, UNSPECIFIED OBESITY TYPE, UNSPECIFIED WHETHER SERIOUS COMORBIDITY PRESENT: ICD-10-CM

## 2019-01-16 DIAGNOSIS — Z79.899 ENCOUNTER FOR LONG-TERM (CURRENT) DRUG USE: ICD-10-CM

## 2019-01-16 DIAGNOSIS — G47.00 INSOMNIA, UNSPECIFIED TYPE: ICD-10-CM

## 2019-01-16 DIAGNOSIS — M79.7 FIBROMYALGIA: Primary | ICD-10-CM

## 2019-01-16 DIAGNOSIS — R53.81 PHYSICAL DECONDITIONING: ICD-10-CM

## 2019-01-16 PROCEDURE — 99213 OFFICE O/P EST LOW 20 MIN: CPT | Mod: ,,, | Performed by: INTERNAL MEDICINE

## 2019-01-16 PROCEDURE — 99213 PR OFFICE/OUTPT VISIT, EST, LEVL III, 20-29 MIN: ICD-10-PCS | Mod: ,,, | Performed by: INTERNAL MEDICINE

## 2019-01-16 NOTE — PROGRESS NOTES
[facility] RHEUMATOLOGY           Follow-up visit    Notes dictated via Dragon to EPIC. Please forgive any unintended errors.  Subjective:       Patient ID:   NAME: Arminda Cooper : 1970     48 y.o. female    Referring Doc: No ref. provider found  Other Physicians:    Chief Complaint:  Fibromyalgia      HPI/Interval History:   The patient has been doing well. She has less muscular pain than at her last visit. Her moods have been stable. She still has difficulty with sleep.          ROS:   GEN:    no fever, night sweats or weight loss  SKIN:   no rashes , erythema, bruising, or swelling, no Raynauds, no photosensitivity  HEENT: no changes in vision, no mouth ulcers, no sicca symptoms, no scalp tenderness, no jaw claudication.  CV:      no CP, PND, TORRES or orthopnea, no palpitations  PULM: no SOB, cough, hemoptysis, sputum or pleuritic pain  GI:       no GERD, no dysphagia, no abdominal pain, nausea, vomiting, constipation, diarrhea, melanotic stools, BRBPR, or hematemesis  :      no hematuria, dysuria  NEURO: no paresthesias, headaches, acute visual disturbances  MUSCULOSKELETAL:  No muscle or joint pain  PSYCH:   + insomnia, no significant anxiety or depression    Medications:    Current Outpatient Medications:     DULoxetine (CYMBALTA) 60 MG capsule, Take 1 capsule (60 mg total) by mouth once daily., Disp: 30 capsule, Rfl: 11    glimepiride (AMARYL) 2 MG tablet, Take 2 mg by mouth before breakfast., Disp: , Rfl:     insulin glargine, TOUJEO, (TOUJEO SOLOSTAR) 300 unit/mL (1.5 mL) InPn pen, Inject into the skin., Disp: , Rfl:     levothyroxine (SYNTHROID) 75 MCG tablet, Take 115 mcg by mouth once daily. , Disp: , Rfl:     LIRAGLUTIDE (VICTOZA 2-SOFÍA SUBQ), Inject 1.2 mg into the skin once daily., Disp: , Rfl:     simvastatin (ZOCOR) 20 MG tablet, Take by mouth., Disp: , Rfl:       FAMILY HISTORY: negative for Connective Tissue Disease        Review of patient's allergies indicates:   Allergen  Reactions    Augmentin [amoxicillin-pot clavulanate] Rash             Objective:     Vitals:  Blood pressure 115/82, weight 99.9 kg (220 lb 3.2 oz).    Physical Examination:   GEN: wn/wd female in no apparent distress  SKIN: no rashes, no sclerodactyly, no Raynaud's, no periungual erythema, no digital tip ulcerations, no nailbed pitting  HEAD: no alopecia, no scalp tenderness, no temporal artery tenderness or induration.  EYES: no pallor, no icterus, PERRLA  ENT:  no thrush, no mucosal dryness or ulcerations, adequate oral hygiene & dentition.  NECK: supple x 6, no masses, no thyromegaly, no lymphadenopathy.  CV:   S1 and S2 regular, no murmurs, gallop or rubs  CHEST: Normal respiratory effort;  normal breath sounds/no adventitious sounds. No signs of consolidation.  ABD: non-tender and non-distended; soft; normal bowel sounds; no rebound/guarding or tenderness. No hepatosplenomegaly.  Musculoskeletal:  No evidence of active inflammatory arthritis or of any progressive deformity  EXTREM: no clubbing, cyanosis or edema. normal pulses.  NEURO:  grossly intact; motor/sensory WNL; no tremors  PSYCH:  normal mood, affect and behavior            Labs:   Lab Results   Component Value Date    WBC 9.3 10/02/2018    HGB 14.8 10/02/2018    HCT 43.8 10/02/2018    MCV 93 10/02/2018     10/02/2018   CMP@  Sodium   Date Value Ref Range Status   10/02/2018 141 134 - 144 mmol/L Final     Potassium   Date Value Ref Range Status   10/02/2018 4.3 3.5 - 5.2 mmol/L Final     Chloride   Date Value Ref Range Status   10/02/2018 99 96 - 106 mmol/L Final     CO2   Date Value Ref Range Status   10/02/2018 24 20 - 29 mmol/L Final     Glucose   Date Value Ref Range Status   10/02/2018 142 (H) 65 - 99 mg/dL Final     BUN, Bld   Date Value Ref Range Status   10/02/2018 10 6 - 24 mg/dL Final     Creatinine   Date Value Ref Range Status   10/02/2018 0.68 0.57 - 1.00 mg/dL Final     Calcium   Date Value Ref Range Status   10/02/2018 9.5 8.7  - 10.2 mg/dL Final     Total Protein   Date Value Ref Range Status   10/02/2018 7.3 6.0 - 8.5 g/dL Final     Albumin   Date Value Ref Range Status   10/02/2018 4.5 3.5 - 5.5 g/dL Final     Total Bilirubin   Date Value Ref Range Status   10/02/2018 0.4 0.0 - 1.2 mg/dL Final     Alkaline Phosphatase   Date Value Ref Range Status   10/02/2018 54 39 - 117 IU/L Final     AST   Date Value Ref Range Status   10/02/2018 21 0 - 40 IU/L Final     ALT   Date Value Ref Range Status   10/02/2018 17 0 - 32 IU/L Final     CRP   Date Value Ref Range Status   10/02/2018 2.4 0.0 - 4.9 mg/L Final         Radiology/Diagnostic Studies:    none    Assessment/Discussion/Plan:   48 y.o. female with fibromyalgia-stable on duloxetine 60 mg daily      PLAN:  She is to continue that medication without change. She has not been taking the sleep medication, Sonata.  We discussed exercise and weight loss again. She is not making much progress.      RTC:  I will see her back in 4 months      Electronically signed by Rashi Eng MD

## 2019-06-03 ENCOUNTER — OFFICE VISIT (OUTPATIENT)
Dept: RHEUMATOLOGY | Facility: CLINIC | Age: 49
End: 2019-06-03
Payer: MEDICAID

## 2019-06-03 VITALS
WEIGHT: 216.88 LBS | SYSTOLIC BLOOD PRESSURE: 107 MMHG | BODY MASS INDEX: 42.36 KG/M2 | DIASTOLIC BLOOD PRESSURE: 75 MMHG

## 2019-06-03 DIAGNOSIS — M79.7 FIBROMYALGIA: Primary | ICD-10-CM

## 2019-06-03 PROCEDURE — 99213 OFFICE O/P EST LOW 20 MIN: CPT | Mod: ,,, | Performed by: INTERNAL MEDICINE

## 2019-06-03 PROCEDURE — 99213 PR OFFICE/OUTPT VISIT, EST, LEVL III, 20-29 MIN: ICD-10-PCS | Mod: ,,, | Performed by: INTERNAL MEDICINE

## 2019-06-03 NOTE — PROGRESS NOTES
[facility] RHEUMATOLOGY           Follow-up visit    Notes dictated via Dragon to EPIC. Please forgive any unintended errors.  Subjective:       Patient ID:   NAME: Armidna Cooper : 1970     49 y.o. female    Referring Doc: No ref. provider found  Other Physicians:    Chief Complaint:  Fibromyalgia      HPI/Interval History:   The patient is doing great. She is having no problem with musculoskeletal pain. She is tolerating Cymbalta well.  She is in the gym, working out regularly and feeling much stronger.        ROS:   GEN:    no fever, night sweats or weight loss  SKIN:   no rashes , erythema, bruising, or swelling, no Raynauds, no photosensitivity  HEENT: no changes in vision, no mouth ulcers, no sicca symptoms, no scalp tenderness, no jaw claudication.  CV:      no CP, PND, TORRES or orthopnea, no palpitations  PULM: no SOB, cough, hemoptysis, sputum or pleuritic pain  GI:       no GERD, no dysphagia, no abdominal pain, nausea, vomiting, constipation, diarrhea, melanotic stools, BRBPR, or hematemesis  :      no hematuria, dysuria  NEURO: no paresthesias, headaches, acute visual disturbances  MUSCULOSKELETAL:  No muscle or joint complaints  PSYCH:   No insomnia, no increased anxiety or depression    Past Medical/Surgical History:  Past Medical History:   Diagnosis Date    Fibromyalgia     H/O shortness of breath     Thyroid disease     Vaginal bleeding      Past Surgical History:   Procedure Laterality Date    ABLATION-ENDOMETRIAL N/A 10/23/2014    Performed by [facility]za [facility] at [facility] OR    CARPAL TUNNEL RELEASE      DILATION AND CURETTAGE OF UTERUS      EYE SURGERY      TUBAL LIGATION         Allergies:  Review of patient's allergies indicates:   Allergen Reactions    Augmentin [amoxicillin-pot clavulanate] Rash       Social/Family History:  Social History     Socioeconomic History    Marital status: Single     Spouse name: Not on file    Number of children: Not on file    Years of  education: Not on file    Highest education level: Not on file   Occupational History    Not on file   Social Needs    Financial resource strain: Not on file    Food insecurity:     Worry: Not on file     Inability: Not on file    Transportation needs:     Medical: Not on file     Non-medical: Not on file   Tobacco Use    Smoking status: Never Smoker   Substance and Sexual Activity    Alcohol use: No    Drug use: Not on file    Sexual activity: Yes     Birth control/protection: Surgical   Lifestyle    Physical activity:     Days per week: Not on file     Minutes per session: Not on file    Stress: Not on file   Relationships    Social connections:     Talks on phone: Not on file     Gets together: Not on file     Attends Scientologist service: Not on file     Active member of club or organization: Not on file     Attends meetings of clubs or organizations: Not on file     Relationship status: Not on file   Other Topics Concern    Not on file   Social History Narrative    Not on file     History reviewed. No pertinent family history.  FAMILY HISTORY: negative for Connective Tissue Disease      Medications:    Current Outpatient Medications:     DULoxetine (CYMBALTA) 60 MG capsule, Take 1 capsule (60 mg total) by mouth once daily., Disp: 30 capsule, Rfl: 11    glimepiride (AMARYL) 2 MG tablet, Take 2 mg by mouth before breakfast., Disp: , Rfl:     insulin glargine, TOUJEO, (TOUJEO SOLOSTAR) 300 unit/mL (1.5 mL) InPn pen, Inject into the skin., Disp: , Rfl:     levothyroxine (SYNTHROID) 75 MCG tablet, Take 115 mcg by mouth once daily. , Disp: , Rfl:     LIRAGLUTIDE (VICTOZA 2-SOFÍA SUBQ), Inject 1.2 mg into the skin once daily., Disp: , Rfl:     simvastatin (ZOCOR) 20 MG tablet, Take by mouth., Disp: , Rfl:         Objective:     Vitals:  Blood pressure 107/75, weight 98.4 kg (216 lb 14.4 oz).    Physical Examination:   GEN: wn/wd female in no apparent distress  SKIN: no rashes, no sclerodactyly, no  Raynaud's, no periungual erythema, no digital tip ulcerations, no nailbed pitting  HEAD: no alopecia, no scalp tenderness, no temporal artery tenderness or induration.  EYES: no pallor, no icterus, PERRLA  ENT:  no thrush, no mucosal dryness or ulcerations, adequate oral hygiene & dentition.  NECK: supple x 6, no masses, no thyromegaly, no lymphadenopathy.  CV:   S1 and S2 regular, no murmurs, gallop or rubs  CHEST: Normal respiratory effort;  normal breath sounds/no adventitious sounds. No signs of consolidation.  ABD: non-tender and non-distended; soft; normal bowel sounds; no rebound/guarding or tenderness. No hepatosplenomegaly.  Musculoskeletal:  No evidence of inflammatory arthritis or of progressive degenerative disease  EXTREM: no clubbing, cyanosis or edema. normal pulses.  NEURO:  grossly intact; motor/sensory WNL; no tremors  PSYCH:  normal mood, affect and behavior            Labs:   Lab Results   Component Value Date    WBC 9.3 10/02/2018    HGB 14.8 10/02/2018    HCT 43.8 10/02/2018    MCV 93 10/02/2018     10/02/2018   CMP@  Sodium   Date Value Ref Range Status   10/02/2018 141 134 - 144 mmol/L Final     Potassium   Date Value Ref Range Status   10/02/2018 4.3 3.5 - 5.2 mmol/L Final     Chloride   Date Value Ref Range Status   10/02/2018 99 96 - 106 mmol/L Final     CO2   Date Value Ref Range Status   10/02/2018 24 20 - 29 mmol/L Final     Glucose   Date Value Ref Range Status   10/02/2018 142 (H) 65 - 99 mg/dL Final     BUN, Bld   Date Value Ref Range Status   10/02/2018 10 6 - 24 mg/dL Final     Creatinine   Date Value Ref Range Status   10/02/2018 0.68 0.57 - 1.00 mg/dL Final     Calcium   Date Value Ref Range Status   10/02/2018 9.5 8.7 - 10.2 mg/dL Final     Total Protein   Date Value Ref Range Status   10/02/2018 7.3 6.0 - 8.5 g/dL Final     Albumin   Date Value Ref Range Status   10/02/2018 4.5 3.5 - 5.5 g/dL Final     Total Bilirubin   Date Value Ref Range Status   10/02/2018 0.4 0.0 -  1.2 mg/dL Final     Alkaline Phosphatase   Date Value Ref Range Status   10/02/2018 54 39 - 117 IU/L Final     AST   Date Value Ref Range Status   10/02/2018 21 0 - 40 IU/L Final     ALT   Date Value Ref Range Status   10/02/2018 17 0 - 32 IU/L Final     CRP   Date Value Ref Range Status   10/02/2018 2.4 0.0 - 4.9 mg/L Final         Radiology/Diagnostic Studies:    None    Assessment/Discussion/Plan:   49 y.o. female with fibromyalgia-stable on Cymbalta 60 mg daily      PLAN:  She will continue her medication though I asked her to take the duloxetine in the morning.      RTC:  I will see her back in 3-4 months      Electronically signed by Rashi Eng MD

## 2019-11-05 ENCOUNTER — OFFICE VISIT (OUTPATIENT)
Dept: RHEUMATOLOGY | Facility: CLINIC | Age: 49
End: 2019-11-05
Payer: MEDICAID

## 2019-11-05 VITALS
DIASTOLIC BLOOD PRESSURE: 71 MMHG | WEIGHT: 211.88 LBS | SYSTOLIC BLOOD PRESSURE: 114 MMHG | BODY MASS INDEX: 41.38 KG/M2

## 2019-11-05 DIAGNOSIS — M19.90 OSTEOARTHRITIS, UNSPECIFIED OSTEOARTHRITIS TYPE, UNSPECIFIED SITE: ICD-10-CM

## 2019-11-05 DIAGNOSIS — G47.00 INSOMNIA, UNSPECIFIED TYPE: ICD-10-CM

## 2019-11-05 DIAGNOSIS — E66.9 OBESITY, UNSPECIFIED CLASSIFICATION, UNSPECIFIED OBESITY TYPE, UNSPECIFIED WHETHER SERIOUS COMORBIDITY PRESENT: ICD-10-CM

## 2019-11-05 DIAGNOSIS — R53.81 PHYSICAL DECONDITIONING: ICD-10-CM

## 2019-11-05 DIAGNOSIS — M79.7 FIBROMYALGIA: Primary | ICD-10-CM

## 2019-11-05 PROCEDURE — 99214 OFFICE O/P EST MOD 30 MIN: CPT | Mod: S$GLB,,, | Performed by: INTERNAL MEDICINE

## 2019-11-05 PROCEDURE — 99214 PR OFFICE/OUTPT VISIT, EST, LEVL IV, 30-39 MIN: ICD-10-PCS | Mod: S$GLB,,, | Performed by: INTERNAL MEDICINE

## 2019-11-05 RX ORDER — DULOXETIN HYDROCHLORIDE 60 MG/1
60 CAPSULE, DELAYED RELEASE ORAL DAILY
Qty: 90 CAPSULE | Refills: 3 | Status: SHIPPED | OUTPATIENT
Start: 2019-11-05 | End: 2022-06-29

## 2019-11-05 NOTE — PROGRESS NOTES
[facility] RHEUMATOLOGY           Follow-up visit    Notes dictated to M*Modal. Please forgive any unintended errors.  Subjective:       Patient ID:   NAME: Arminda Cooper : 1970     49 y.o. female    Referring Doc: No ref. provider found  Other Physicians:    Chief Complaint:  Fibromyalgia      HPI/Interval History:  The patient is doing well.  She has no complaints of musculoskeletal pain.  Her energy level is poor.  She is not exercising at all.    ROS:   GEN:    no fever, night sweats or weight loss  SKIN:   no rashes, erythema, bruising, or swelling, no Raynauds, no photosensitivity  HEENT: no changes in vision, no mouth ulcers, no sicca symptoms, no scalp tenderness, no jaw claudication.  CV:      no CP, PND, TORRES, orthopnea, no palpitations  PULM: no SOB, cough, hemoptysis, sputum or pleuritic pain  GI:       no GERD, no dysphagia, no hematemesis, no abdominal pain, nausea, vomiting, constipation, diarrhea, melanotic stools, BRBPR  :      no hematuria, dysuria  NEURO: no paresthesias, headaches, acute visual disturbances  MUSCULOSKELETAL:  Myalgias notable in the evening.  PSYCH:   No increased insomnia, no increased anxiety, no increased depression    Past Medical/Surgical History:  Past Medical History:   Diagnosis Date    Fibromyalgia     H/O shortness of breath     Thyroid disease     Vaginal bleeding      Past Surgical History:   Procedure Laterality Date    CARPAL TUNNEL RELEASE      DILATION AND CURETTAGE OF UTERUS      EYE SURGERY      TUBAL LIGATION         Allergies:  Review of patient's allergies indicates:   Allergen Reactions    Augmentin [amoxicillin-pot clavulanate] Rash       Social/Family History:  Social History     Socioeconomic History    Marital status: Single     Spouse name: Not on file    Number of children: Not on file    Years of education: Not on file    Highest education level: Not on file   Occupational History    Not on file   Social Needs    Financial  resource strain: Not on file    Food insecurity:     Worry: Not on file     Inability: Not on file    Transportation needs:     Medical: Not on file     Non-medical: Not on file   Tobacco Use    Smoking status: Never Smoker   Substance and Sexual Activity    Alcohol use: No    Drug use: Not on file    Sexual activity: Yes     Birth control/protection: Surgical   Lifestyle    Physical activity:     Days per week: Not on file     Minutes per session: Not on file    Stress: Not on file   Relationships    Social connections:     Talks on phone: Not on file     Gets together: Not on file     Attends [employer] service: Not on file     Active member of club or organization: Not on file     Attends meetings of clubs or organizations: Not on file     Relationship status: Not on file   Other Topics Concern    Not on file   Social History Narrative    Not on file     History reviewed. No pertinent family history.  FAMILY HISTORY: negative for Connective Tissue Disease      Medications:    Current Outpatient Medications:     DULoxetine (CYMBALTA) 60 MG capsule, Take 1 capsule (60 mg total) by mouth once daily., Disp: 90 capsule, Rfl: 3    glimepiride (AMARYL) 2 MG tablet, Take 2 mg by mouth before breakfast., Disp: , Rfl:     insulin glargine, TOUJEO, (TOUJEO SOLOSTAR) 300 unit/mL (1.5 mL) InPn pen, Inject into the skin., Disp: , Rfl:     levothyroxine (SYNTHROID) 75 MCG tablet, Take 115 mcg by mouth once daily. , Disp: , Rfl:     LIRAGLUTIDE (VICTOZA 2-SOFÍA SUBQ), Inject 1.2 mg into the skin once daily., Disp: , Rfl:     simvastatin (ZOCOR) 20 MG tablet, Take by mouth., Disp: , Rfl:       Objective:     Vitals:  Blood pressure 114/71, weight 96.1 kg (211 lb 14.4 oz).    Physical Examination:   GEN: wn/wd female in no apparent distress  SKIN: no rashes, no sclerodactyly, no Raynaud's, no periungual erythema, no digital tip ulcerations, no nailbed pitting  HEAD: no alopecia, no scalp tenderness, no temporal  artery tenderness or induration.  EYES: no pallor, no icterus, PERRLA  ENT:  no thrush, no mucosal dryness or ulcerations, adequate oral hygiene & dentition.  NECK: supple x 6, no masses, no thyromegaly, no lymphadenopathy.  CV:   S1 and S2 regular, no murmurs, gallop or rubs  CHEST: Normal respiratory effort;  normal breath sounds/no adventitious sounds. No signs of consolidation.  ABD: non-tender and non-distended; soft; normal bowel sounds; no rebound/guarding or tenderness. No hepatosplenomegaly.  Musculoskeletal:  No evidence of inflammatory or degenerative disease  EXTREM: no clubbing, cyanosis or edema. normal pulses.  NEURO:  grossly intact; motor/sensory WNL; no tremors  PSYCH:  normal mood, affect and behavior    Labs:   Lab Results   Component Value Date    WBC 9.3 10/02/2018    HGB 14.8 10/02/2018    HCT 43.8 10/02/2018    MCV 93 10/02/2018     10/02/2018   CMP@  Sodium   Date Value Ref Range Status   10/02/2018 141 134 - 144 mmol/L Final     Potassium   Date Value Ref Range Status   10/02/2018 4.3 3.5 - 5.2 mmol/L Final     Chloride   Date Value Ref Range Status   10/02/2018 99 96 - 106 mmol/L Final     CO2   Date Value Ref Range Status   10/02/2018 24 20 - 29 mmol/L Final     Glucose   Date Value Ref Range Status   10/02/2018 142 (H) 65 - 99 mg/dL Final     BUN, Bld   Date Value Ref Range Status   10/02/2018 10 6 - 24 mg/dL Final     Creatinine   Date Value Ref Range Status   10/02/2018 0.68 0.57 - 1.00 mg/dL Final     Calcium   Date Value Ref Range Status   10/02/2018 9.5 8.7 - 10.2 mg/dL Final     Total Protein   Date Value Ref Range Status   10/02/2018 7.3 6.0 - 8.5 g/dL Final     Albumin   Date Value Ref Range Status   10/02/2018 4.5 3.5 - 5.5 g/dL Final     Total Bilirubin   Date Value Ref Range Status   10/02/2018 0.4 0.0 - 1.2 mg/dL Final     Alkaline Phosphatase   Date Value Ref Range Status   10/02/2018 54 39 - 117 IU/L Final     AST   Date Value Ref Range Status   10/02/2018 21 0 - 40  IU/L Final     ALT   Date Value Ref Range Status   10/02/2018 17 0 - 32 IU/L Final     CRP   Date Value Ref Range Status   10/02/2018 2.4 0.0 - 4.9 mg/L Final       Radiology/Diagnostic Studies:    None    Assessment/Discussion/Plan:   49 y.o. female with fibromyalgia-generally stable on Cymbalta 60 mg daily  2) fatigued/for stamina-related to obesity and deconditioning    PLAN:  I have discussed the absolute need for her to begin dieting and to begin a daily exercise routine.  She states she is anxious to do so.  I gave her some ideas about how she might begin an exercise program.  Joining a local gym which is covered by her insurance would be helpful though it is unlikely that Medicaid includes that benefit.  The alternative, I suggested securing a treadmill or stationary bicycle at a [employer] sale so that she has a quit min on hand with which she can exercise at home daily.    RTC:  I will see her back in    Electronically signed by Rashi Eng MD

## 2020-01-15 ENCOUNTER — LAB VISIT (OUTPATIENT)
Dept: LAB | Facility: HOSPITAL | Age: 50
End: 2020-01-15
Attending: INTERNAL MEDICINE
Payer: MEDICAID

## 2020-01-15 ENCOUNTER — OFFICE VISIT (OUTPATIENT)
Dept: RHEUMATOLOGY | Facility: CLINIC | Age: 50
End: 2020-01-15
Payer: MEDICAID

## 2020-01-15 VITALS
DIASTOLIC BLOOD PRESSURE: 71 MMHG | WEIGHT: 206.69 LBS | BODY MASS INDEX: 40.37 KG/M2 | SYSTOLIC BLOOD PRESSURE: 120 MMHG

## 2020-01-15 DIAGNOSIS — M79.7 FIBROMYALGIA: Primary | ICD-10-CM

## 2020-01-15 DIAGNOSIS — B34.9 VIRAL SYNDROME: ICD-10-CM

## 2020-01-15 DIAGNOSIS — M79.7 FIBROMYALGIA: ICD-10-CM

## 2020-01-15 LAB
ALBUMIN SERPL BCP-MCNC: 3.8 G/DL (ref 3.5–5.2)
ALP SERPL-CCNC: 39 U/L (ref 55–135)
ALT SERPL W/O P-5'-P-CCNC: 14 U/L (ref 10–44)
ANION GAP SERPL CALC-SCNC: 10 MMOL/L (ref 8–16)
AST SERPL-CCNC: 16 U/L (ref 10–40)
BASOPHILS # BLD AUTO: 0.03 K/UL (ref 0–0.2)
BASOPHILS NFR BLD: 0.2 % (ref 0–1.9)
BILIRUB SERPL-MCNC: 0.8 MG/DL (ref 0.1–1)
BUN SERPL-MCNC: 22 MG/DL (ref 6–20)
CALCIUM SERPL-MCNC: 8.6 MG/DL (ref 8.7–10.5)
CHLORIDE SERPL-SCNC: 99 MMOL/L (ref 95–110)
CO2 SERPL-SCNC: 27 MMOL/L (ref 23–29)
CREAT SERPL-MCNC: 0.7 MG/DL (ref 0.5–1.4)
CRP SERPL-MCNC: 3.81 MG/DL (ref 0–0.75)
DIFFERENTIAL METHOD: ABNORMAL
EOSINOPHIL # BLD AUTO: 0.1 K/UL (ref 0–0.5)
EOSINOPHIL NFR BLD: 0.7 % (ref 0–8)
ERYTHROCYTE [DISTWIDTH] IN BLOOD BY AUTOMATED COUNT: 12.3 % (ref 11.5–14.5)
ERYTHROCYTE [SEDIMENTATION RATE] IN BLOOD BY WESTERGREN METHOD: 12 MM/HR (ref 0–20)
EST. GFR  (AFRICAN AMERICAN): >60 ML/MIN/1.73 M^2
EST. GFR  (NON AFRICAN AMERICAN): >60 ML/MIN/1.73 M^2
GLUCOSE SERPL-MCNC: 262 MG/DL (ref 70–110)
HCT VFR BLD AUTO: 45.7 % (ref 37–48.5)
HGB BLD-MCNC: 15.4 G/DL (ref 12–16)
IMM GRANULOCYTES # BLD AUTO: 0.05 K/UL (ref 0–0.04)
IMM GRANULOCYTES NFR BLD AUTO: 0.4 % (ref 0–0.5)
LYMPHOCYTES # BLD AUTO: 2.8 K/UL (ref 1–4.8)
LYMPHOCYTES NFR BLD: 22.4 % (ref 18–48)
MCH RBC QN AUTO: 32 PG (ref 27–31)
MCHC RBC AUTO-ENTMCNC: 33.7 G/DL (ref 32–36)
MCV RBC AUTO: 95 FL (ref 82–98)
MONOCYTES # BLD AUTO: 0.8 K/UL (ref 0.3–1)
MONOCYTES NFR BLD: 6.9 % (ref 4–15)
NEUTROPHILS # BLD AUTO: 8.5 K/UL (ref 1.8–7.7)
NEUTROPHILS NFR BLD: 69.4 % (ref 38–73)
NRBC BLD-RTO: 0 /100 WBC
PLATELET # BLD AUTO: 248 K/UL (ref 150–350)
PMV BLD AUTO: 10.5 FL (ref 9.2–12.9)
POTASSIUM SERPL-SCNC: 4 MMOL/L (ref 3.5–5.1)
PROT SERPL-MCNC: 7.4 G/DL (ref 6–8.4)
RBC # BLD AUTO: 4.81 M/UL (ref 4–5.4)
SODIUM SERPL-SCNC: 136 MMOL/L (ref 136–145)
WBC # BLD AUTO: 12.25 K/UL (ref 3.9–12.7)

## 2020-01-15 PROCEDURE — 99214 OFFICE O/P EST MOD 30 MIN: CPT | Mod: S$GLB,,, | Performed by: INTERNAL MEDICINE

## 2020-01-15 PROCEDURE — 86140 C-REACTIVE PROTEIN: CPT

## 2020-01-15 PROCEDURE — 99214 PR OFFICE/OUTPT VISIT, EST, LEVL IV, 30-39 MIN: ICD-10-PCS | Mod: S$GLB,,, | Performed by: INTERNAL MEDICINE

## 2020-01-15 PROCEDURE — 85025 COMPLETE CBC W/AUTO DIFF WBC: CPT

## 2020-01-15 PROCEDURE — 36415 COLL VENOUS BLD VENIPUNCTURE: CPT

## 2020-01-15 PROCEDURE — 85651 RBC SED RATE NONAUTOMATED: CPT

## 2020-01-15 PROCEDURE — 80053 COMPREHEN METABOLIC PANEL: CPT

## 2020-01-15 NOTE — PROGRESS NOTES
[facility] RHEUMATOLOGY           Follow-up visit    Notes dictated to M*Modal. Please forgive any unintended errors.  Subjective:       Patient ID:   NAME: Arminad Cooper : 1970     49 y.o. female    Referring Doc: No ref. provider found  Other Physicians:    Chief Complaint:  Fibromyalgia (Needs refill on Cymbalta)      HPI/Interval History:  The patient states she has had aching and feverishness over the past week or two.  She has not noted any high temperature and has not experienced rigors.  She is having nausea and diarrhea.  She has not had any cough, chest pain, abdominal pain, or urinary symptoms.    ROS:   GEN:    no fever, night sweats or weight loss  SKIN:   no rashes, erythema, bruising, or swelling, no Raynauds, no photosensitivity  HEENT: no changes in vision, no mouth ulcers, no sicca symptoms, no scalp tenderness, no jaw claudication.  CV:      no CP, PND, TORRES, orthopnea, no palpitations  PULM: no SOB, cough, hemoptysis, sputum or pleuritic pain  GI:       no GERD, no dysphagia, no hematemesis, no abdominal pain, + nausea, +/- vomiting, - constipation, + diarrhea, no melanotic stools, BRBPR  :      no hematuria, dysuria  NEURO: no paresthesias, headaches, acute visual disturbances  MUSCULOSKELETAL:  Myalgias and arthralgias without any complaints of red, hot, and/or swollen joints  PSYCH:   No increased insomnia, no increased anxiety, no increased depression    Past Medical/Surgical History:  Past Medical History:   Diagnosis Date    Fibromyalgia     H/O shortness of breath     Thyroid disease     Vaginal bleeding      Past Surgical History:   Procedure Laterality Date    CARPAL TUNNEL RELEASE      DILATION AND CURETTAGE OF UTERUS      EYE SURGERY      TUBAL LIGATION         Allergies:  Review of patient's allergies indicates:   Allergen Reactions    Augmentin [amoxicillin-pot clavulanate] Rash       Social/Family History:  Social History     Socioeconomic History    Marital  status: Single     Spouse name: Not on file    Number of children: Not on file    Years of education: Not on file    Highest education level: Not on file   Occupational History    Not on file   Social Needs    Financial resource strain: Not on file    Food insecurity:     Worry: Not on file     Inability: Not on file    Transportation needs:     Medical: Not on file     Non-medical: Not on file   Tobacco Use    Smoking status: Never Smoker   Substance and Sexual Activity    Alcohol use: No    Drug use: Not on file    Sexual activity: Yes     Birth control/protection: Surgical   Lifestyle    Physical activity:     Days per week: Not on file     Minutes per session: Not on file    Stress: Not on file   Relationships    Social connections:     Talks on phone: Not on file     Gets together: Not on file     Attends [employer]regation service: Not on file     Active member of club or organization: Not on file     Attends meetings of clubs or organizations: Not on file     Relationship status: Not on file   Other Topics Concern    Not on file   Social History Narrative    Not on file     History reviewed. No pertinent family history.  FAMILY HISTORY: negative for Connective Tissue Disease      Medications:    Current Outpatient Medications:     DULoxetine (CYMBALTA) 60 MG capsule, Take 1 capsule (60 mg total) by mouth once daily., Disp: 90 capsule, Rfl: 3    glimepiride (AMARYL) 2 MG tablet, Take 2 mg by mouth before breakfast., Disp: , Rfl:     insulin glargine, TOUJEO, (TOUJEO SOLOSTAR) 300 unit/mL (1.5 mL) InPn pen, Inject into the skin., Disp: , Rfl:     levothyroxine (SYNTHROID) 75 MCG tablet, Take 115 mcg by mouth once daily. , Disp: , Rfl:     LIRAGLUTIDE (VICTOZA 2-SOFÍA SUBQ), Inject 1.2 mg into the skin once daily., Disp: , Rfl:     simvastatin (ZOCOR) 20 MG tablet, Take by mouth., Disp: , Rfl:       Objective:     Vitals:  Blood pressure 120/71, weight 93.8 kg (206 lb 11.2 oz).  Afebrile  Physical  Examination:   GEN: wn/wd female in no apparent distress  SKIN: no rashes, no sclerodactyly, no Raynaud's, no periungual erythema, no digital tip ulcerations, no nailbed pitting  HEAD: no alopecia, no scalp tenderness, no temporal artery tenderness or induration.  EYES: no pallor, no icterus, PERRLA  ENT:  no thrush, no mucosal dryness or ulcerations, adequate oral hygiene & dentition.  NECK: supple x 6, no masses, no thyromegaly, no lymphadenopathy.  CV:   S1 and S2 regular, no murmurs, gallop or rubs  CHEST: Normal respiratory effort;  normal breath sounds/no adventitious sounds. No signs of consolidation.  ABD:  Mildly distended; normal bowel sounds; no rebound/guarding or tenderness. No hepatosplenomegaly.  Musculoskeletal:  No evidence of inflammatory arthritis  EXTREM: no clubbing, cyanosis or edema. normal pulses.  NEURO:  grossly intact; motor/sensory WNL; no tremors  PSYCH:  normal mood, affect and behavior    Labs:   Lab Results   Component Value Date    WBC 9.3 10/02/2018    HGB 14.8 10/02/2018    HCT 43.8 10/02/2018    MCV 93 10/02/2018     10/02/2018   CMP@  Sodium   Date Value Ref Range Status   10/02/2018 141 134 - 144 mmol/L Final     Potassium   Date Value Ref Range Status   10/02/2018 4.3 3.5 - 5.2 mmol/L Final     Chloride   Date Value Ref Range Status   10/02/2018 99 96 - 106 mmol/L Final     CO2   Date Value Ref Range Status   10/02/2018 24 20 - 29 mmol/L Final     Glucose   Date Value Ref Range Status   10/02/2018 142 (H) 65 - 99 mg/dL Final     BUN, Bld   Date Value Ref Range Status   10/02/2018 10 6 - 24 mg/dL Final     Creatinine   Date Value Ref Range Status   10/02/2018 0.68 0.57 - 1.00 mg/dL Final     Calcium   Date Value Ref Range Status   10/02/2018 9.5 8.7 - 10.2 mg/dL Final     Total Protein   Date Value Ref Range Status   10/02/2018 7.3 6.0 - 8.5 g/dL Final     Albumin   Date Value Ref Range Status   10/02/2018 4.5 3.5 - 5.5 g/dL Final     Total Bilirubin   Date Value Ref  Range Status   10/02/2018 0.4 0.0 - 1.2 mg/dL Final     Alkaline Phosphatase   Date Value Ref Range Status   10/02/2018 54 39 - 117 IU/L Final     AST   Date Value Ref Range Status   10/02/2018 21 0 - 40 IU/L Final     ALT   Date Value Ref Range Status   10/02/2018 17 0 - 32 IU/L Final     CRP   Date Value Ref Range Status   10/02/2018 2.4 0.0 - 4.9 mg/L Final       Radiology/Diagnostic Studies:    None    Assessment/Discussion/Plan:   49 y.o. female with fibromyalgia-stable on Cymbalta 60 mg daily  2) likely viral syndrome    PLAN:  I have encouraged her to force fluids, particularly diet sports drinks.  I have ordered appropriate blood testing as well as influenza titers.  She has an appointment next week with her primary care provider.  I have suggested she contact that office to see if she can be seen sooner.  She was advised to go immediately to the emergency room if her condition worsens including high fever, shaking chills, forceful vomiting, melena or hematochezia.    RTC:  I will see her back in 3 months or sooner if need be    Electronically signed by Rashi Eng MD

## 2020-03-23 ENCOUNTER — TELEPHONE (OUTPATIENT)
Dept: RHEUMATOLOGY | Facility: CLINIC | Age: 50
End: 2020-03-23

## 2020-07-01 ENCOUNTER — OFFICE VISIT (OUTPATIENT)
Dept: PRIMARY CARE CLINIC | Facility: CLINIC | Age: 50
End: 2020-07-01
Payer: MEDICAID

## 2020-07-01 VITALS
SYSTOLIC BLOOD PRESSURE: 121 MMHG | OXYGEN SATURATION: 96 % | DIASTOLIC BLOOD PRESSURE: 79 MMHG | TEMPERATURE: 98 F | HEART RATE: 102 BPM | RESPIRATION RATE: 18 BRPM

## 2020-07-01 DIAGNOSIS — U07.1 COVID-19: Primary | ICD-10-CM

## 2020-07-01 PROCEDURE — 99203 OFFICE O/P NEW LOW 30 MIN: CPT | Mod: S$GLB,,, | Performed by: EMERGENCY MEDICINE

## 2020-07-01 PROCEDURE — 99203 PR OFFICE/OUTPT VISIT, NEW, LEVL III, 30-44 MIN: ICD-10-PCS | Mod: S$GLB,,, | Performed by: EMERGENCY MEDICINE

## 2020-07-01 PROCEDURE — U0003 INFECTIOUS AGENT DETECTION BY NUCLEIC ACID (DNA OR RNA); SEVERE ACUTE RESPIRATORY SYNDROME CORONAVIRUS 2 (SARS-COV-2) (CORONAVIRUS DISEASE [COVID-19]), AMPLIFIED PROBE TECHNIQUE, MAKING USE OF HIGH THROUGHPUT TECHNOLOGIES AS DESCRIBED BY CMS-2020-01-R: HCPCS

## 2020-07-01 NOTE — PROGRESS NOTES
Subjective:        Time seen by provider: 4:56 PM on 07/01/2020    Arminda Cooper is a 50 y.o. female who presents for an evaluation of possible COVID-19. The patient denies any symptoms at this time. She reports exposure to her daughter who was asymptomatic and recently tested positive for COVID-19. Daughter lives in the same household as patient and has been self-quarantining in a separate bedroom. No pertinent PMHx or PSHx.    Review of Systems   Constitutional: Negative for activity change, appetite change, fatigue and fever.   HENT: Negative for congestion, rhinorrhea and sore throat.    Respiratory: Negative for cough, chest tightness, shortness of breath and wheezing.    Cardiovascular: Negative for chest pain and palpitations.   Gastrointestinal: Negative for diarrhea, nausea and vomiting.   Musculoskeletal: Negative for arthralgias and myalgias.   Skin: Negative for rash.   Neurological: Negative for weakness, light-headedness, numbness and headaches.       Objective:      Physical Exam  Vitals signs and nursing note reviewed.   Constitutional:       General: She is not in acute distress.     Appearance: She is well-developed. She is not diaphoretic.   HENT:      Head: Normocephalic and atraumatic.      Nose: Nose normal.   Eyes:      Conjunctiva/sclera: Conjunctivae normal.   Neck:      Musculoskeletal: Normal range of motion.   Cardiovascular:      Rate and Rhythm: Normal rate and regular rhythm.      Heart sounds: Normal heart sounds. No murmur.   Pulmonary:      Effort: No respiratory distress.      Breath sounds: Normal breath sounds. No wheezing.   Musculoskeletal: Normal range of motion.   Skin:     General: Skin is warm and dry.   Neurological:      Mental Status: She is alert and oriented to person, place, and time.         Assessment and Plan:      Diagnoses and all orders for this visit:    COVID-19  -     COVID-19 Routine Screening  - Discharge home and await results.   - Return to clinic or  ED for new or worsening symptoms.   - Follow-up with PCP as needed.     Scribe Attestation:   I, Tammi Perkins, am scribing for, and in the presence of, Nano Cheney PA-C. I performed the above scribed service and the documentation accurately describes the services I performed. I attest to the accuracy of the note.    I, Nano Cheney PA-C, personally performed the services described in this documentation. All medical record entries made by the scribe were at my direction and in my presence.  I have reviewed the chart and agree that the record reflects my personal performance and is accurate and complete. Nano Cheney PA-C.  6:36 PM 07/01/2020

## 2020-07-02 LAB — SARS-COV-2 RNA RESP QL NAA+PROBE: NOT DETECTED

## 2021-04-29 ENCOUNTER — PATIENT MESSAGE (OUTPATIENT)
Dept: RESEARCH | Facility: HOSPITAL | Age: 51
End: 2021-04-29

## 2021-05-05 ENCOUNTER — OFFICE VISIT (OUTPATIENT)
Dept: RHEUMATOLOGY | Facility: CLINIC | Age: 51
End: 2021-05-05
Payer: MEDICAID

## 2021-05-05 VITALS
BODY MASS INDEX: 41.09 KG/M2 | SYSTOLIC BLOOD PRESSURE: 120 MMHG | DIASTOLIC BLOOD PRESSURE: 80 MMHG | WEIGHT: 210.38 LBS

## 2021-05-05 DIAGNOSIS — G47.00 INSOMNIA, UNSPECIFIED TYPE: ICD-10-CM

## 2021-05-05 DIAGNOSIS — M79.7 FIBROMYALGIA: Primary | ICD-10-CM

## 2021-05-05 PROCEDURE — 99213 OFFICE O/P EST LOW 20 MIN: CPT | Mod: S$GLB,,, | Performed by: INTERNAL MEDICINE

## 2021-05-05 PROCEDURE — 99213 PR OFFICE/OUTPT VISIT, EST, LEVL III, 20-29 MIN: ICD-10-PCS | Mod: S$GLB,,, | Performed by: INTERNAL MEDICINE

## 2021-05-05 RX ORDER — LEVOTHYROXINE SODIUM 125 UG/1
TABLET ORAL
COMMUNITY
Start: 2021-04-22

## 2021-05-05 RX ORDER — ROSUVASTATIN CALCIUM 20 MG/1
TABLET, COATED ORAL
COMMUNITY
Start: 2021-04-27

## 2021-09-22 ENCOUNTER — TELEPHONE (OUTPATIENT)
Dept: RHEUMATOLOGY | Facility: CLINIC | Age: 51
End: 2021-09-22

## 2021-09-22 ENCOUNTER — PATIENT MESSAGE (OUTPATIENT)
Dept: RHEUMATOLOGY | Facility: CLINIC | Age: 51
End: 2021-09-22

## 2022-03-10 ENCOUNTER — PATIENT MESSAGE (OUTPATIENT)
Dept: RHEUMATOLOGY | Facility: CLINIC | Age: 52
End: 2022-03-10
Payer: MEDICAID

## 2022-06-29 ENCOUNTER — OFFICE VISIT (OUTPATIENT)
Dept: RHEUMATOLOGY | Facility: CLINIC | Age: 52
End: 2022-06-29
Payer: MEDICAID

## 2022-06-29 VITALS — SYSTOLIC BLOOD PRESSURE: 107 MMHG | WEIGHT: 200.69 LBS | BODY MASS INDEX: 39.2 KG/M2 | DIASTOLIC BLOOD PRESSURE: 80 MMHG

## 2022-06-29 DIAGNOSIS — M79.7 FIBROMYALGIA: Primary | ICD-10-CM

## 2022-06-29 PROCEDURE — 99212 PR OFFICE/OUTPT VISIT, EST, LEVL II, 10-19 MIN: ICD-10-PCS | Mod: S$GLB,,, | Performed by: INTERNAL MEDICINE

## 2022-06-29 PROCEDURE — 99212 OFFICE O/P EST SF 10 MIN: CPT | Mod: S$GLB,,, | Performed by: INTERNAL MEDICINE

## 2022-06-29 RX ORDER — IBUPROFEN 800 MG/1
800 TABLET ORAL EVERY 6 HOURS PRN
COMMUNITY
Start: 2022-06-13

## 2022-06-29 NOTE — PROGRESS NOTES
[facility] RHEUMATOLOGY           Follow-up visit    Notes dictated to M*Modal. Please forgive any unintended errors.  Subjective:       Patient ID:   NAME: Arminda Cooper : 1970     52 y.o. female    Referring Doc: No ref. provider found  Other Physicians:    Chief Complaint:  Fibromyalgia      HPI/Interval History:  The patient is doing well.  She has been getting Cymbalta from her primary care doctor.  She has no new complaints other than slightly increased insomnia.    ROS:   GEN:    no fever, night sweats or weight loss  SKIN:   no rashes, bruising, or swelling, no Raynauds, no photosensitivity  HEENT: no changes in vision, no mouth ulcers, no sicca symptoms, no scalp tenderness, no jaw claudication.  CV:      no CP, PND, TORRES, orthopnea, no palpitations  PULM: no SOB, cough, hemoptysis, sputum or pleuritic pain  GI:        no dysphagia, no GERD, no hematemesis, no abdominal pain, nausea, vomiting, constipation, diarrhea, melanotic stools, hematochezia  :      no hematuria, dysuria  NEURO: no paresthesias, headaches, seizures  MUSCULOSKELETAL:  No red, hot, and/or swollen joints  PSYCH:   + increased insomnia, no increased anxiety, no increased depression    Past Medical/Surgical History:  Past Medical History:   Diagnosis Date    Fibromyalgia     H/O shortness of breath     Thyroid disease     Vaginal bleeding      Past Surgical History:   Procedure Laterality Date    CARPAL TUNNEL RELEASE      DILATION AND CURETTAGE OF UTERUS      EYE SURGERY      TUBAL LIGATION         Allergies:  Review of patient's allergies indicates:   Allergen Reactions    Augmentin [amoxicillin-pot clavulanate] Rash       Social/Family History:  Social History     Socioeconomic History    Marital status: Single   Tobacco Use    Smoking status: Never Smoker   Substance and Sexual Activity    Alcohol use: No    Sexual activity: Yes     Birth control/protection: Surgical     History reviewed. No pertinent family  history.  FAMILY HISTORY: negative for Connective Tissue Disease      Medications:    Current Outpatient Medications:     DULoxetine (CYMBALTA) 60 MG capsule, Take 1 capsule (60 mg total) by mouth once daily., Disp: 90 capsule, Rfl: 3    ergocalciferol, vitamin D2, (VITAMIN D ORAL), Take by mouth., Disp: , Rfl:     ibuprofen (ADVIL,MOTRIN) 800 MG tablet, Take 800 mg by mouth every 6 (six) hours as needed., Disp: , Rfl:     insulin glargine, TOUJEO, (TOUJEO) 300 unit/mL (1.5 mL) InPn pen, Inject into the skin., Disp: , Rfl:     levothyroxine (SYNTHROID) 125 MCG tablet, , Disp: , Rfl:     LIRAGLUTIDE (VICTOZA 2-SOFÍA SUBQ), Inject 1.2 mg into the skin once daily., Disp: , Rfl:     rosuvastatin (CRESTOR) 20 MG tablet, , Disp: , Rfl:       Objective:     Vitals:  Blood pressure 107/80, weight 91 kg (200 lb 11.2 oz).    Physical Examination:   GEN: wn/wd female in no apparent distress  SKIN: no rashes, no sclerodactyly, no Raynaud's, no periungual erythema, no digital tip ulcerations, no nailbed pitting  HEAD: no alopecia, no scalp tenderness, no temporal artery tenderness or induration.  EYES: no pallor, no icterus, PERRLA  ENT:  no thrush, no mucosal dryness or ulcerations, adequate oral hygiene & dentition.  NECK: supple x 6, no masses, no thyromegaly, no lymphadenopathy.  CV:   S1 and S2 regular, no murmurs, gallop or rubs  CHEST: Normal respiratory effort;  normal breath sounds/no adventitious sounds. No signs of consolidation.  ABD: non-tender and non-distended; soft; normal bowel sounds; no rebound/guarding or tenderness. No hepatosplenomegaly.  Musculoskeletal:  No evidence of inflammatory arthritis  EXTREM: no clubbing, cyanosis or edema. normal pulses.  NEURO:  grossly intact; motor/sensory WNL; no tremors  PSYCH:  normal mood, affect and behavior    Labs:   Lab Results   Component Value Date    WBC 12.25 01/15/2020    HGB 15.4 01/15/2020    HCT 45.7 01/15/2020    MCV 95 01/15/2020     01/15/2020    CMP@  Sodium   Date Value Ref Range Status   01/15/2020 136 136 - 145 mmol/L Final     Potassium   Date Value Ref Range Status   01/15/2020 4.0 3.5 - 5.1 mmol/L Final     Chloride   Date Value Ref Range Status   01/15/2020 99 95 - 110 mmol/L Final     CO2   Date Value Ref Range Status   01/15/2020 27 23 - 29 mmol/L Final     Glucose   Date Value Ref Range Status   01/15/2020 262 (H) 70 - 110 mg/dL Final     BUN   Date Value Ref Range Status   01/15/2020 22 (H) 6 - 20 mg/dL Final     Creatinine   Date Value Ref Range Status   01/15/2020 0.7 0.5 - 1.4 mg/dL Final     Calcium   Date Value Ref Range Status   01/15/2020 8.6 (L) 8.7 - 10.5 mg/dL Final     Total Protein   Date Value Ref Range Status   01/15/2020 7.4 6.0 - 8.4 g/dL Final     Albumin   Date Value Ref Range Status   01/15/2020 3.8 3.5 - 5.2 g/dL Final     Total Bilirubin   Date Value Ref Range Status   01/15/2020 0.8 0.1 - 1.0 mg/dL Final     Comment:     For infants and newborns, interpretation of results should be based  on gestational age, weight and in agreement with clinical  observations.  Premature Infant recommended reference ranges:  Up to 24 hours.............<8.0 mg/dL  Up to 48 hours............<12.0 mg/dL  3-5 days..................<15.0 mg/dL  6-29 days.................<15.0 mg/dL       Alkaline Phosphatase   Date Value Ref Range Status   01/15/2020 39 (L) 55 - 135 U/L Final     AST   Date Value Ref Range Status   01/15/2020 16 10 - 40 U/L Final     ALT   Date Value Ref Range Status   01/15/2020 14 10 - 44 U/L Final     CRP   Date Value Ref Range Status   01/15/2020 3.81 (H) 0.00 - 0.75 mg/dL Final       Radiology/Diagnostic Studies:    None    Assessment/Discussion/Plan:   52 y.o. female with fibromyalgia-stable on Cymbalta 60 mg daily    PLAN:  She will continue her medication without change.  I have recommended diphenhydramine and/or melatonin OTC for her insomnia.    RTC:  She will follow-up with her primary care  physician        Electronically signed by Rashi Eng MD      Patient notified that this office will be closing December 22, 2022. They should begin looking for another rheumatologist as soon as possible.  A list with the names and contact details of rheumatologists in the surrounding area was provided.

## 2024-06-19 DIAGNOSIS — H53.40 UNSPECIFIED VISUAL FIELD DEFECTS: Primary | ICD-10-CM

## 2024-06-28 ENCOUNTER — HOSPITAL ENCOUNTER (OUTPATIENT)
Dept: RADIOLOGY | Facility: HOSPITAL | Age: 54
Discharge: HOME | End: 2024-06-28
Attending: OPHTHALMOLOGY
Payer: MEDICAID

## 2024-06-28 DIAGNOSIS — H53.40 UNSPECIFIED VISUAL FIELD DEFECTS: ICD-10-CM

## 2024-06-28 PROCEDURE — 70551 MRI BRAIN STEM W/O DYE: CPT | Mod: TC,PO

## 2024-06-28 PROCEDURE — 70551 MRI BRAIN STEM W/O DYE: CPT | Mod: 26,,, | Performed by: RADIOLOGY

## 2024-07-30 ENCOUNTER — TELEPHONE (OUTPATIENT)
Dept: OPHTHALMOLOGY | Facility: CLINIC | Age: 54
End: 2024-07-30
Payer: MEDICAID

## 2024-07-30 NOTE — TELEPHONE ENCOUNTER
Spoke with pt, she states that someone had called to verify her insurance, [facility] does not accept insurance- she will be referred elsewhere.

## 2025-02-14 ENCOUNTER — TELEPHONE (OUTPATIENT)
Dept: GASTROENTEROLOGY | Facility: CLINIC | Age: 55
End: 2025-02-14
Payer: MEDICAID

## 2025-02-14 NOTE — TELEPHONE ENCOUNTER
Call placed to Ms. Cooper in regards to scheduling colonoscopy for positive cologuard. No answer, left message to return call.

## 2025-02-14 NOTE — TELEPHONE ENCOUNTER
----- Message from Nilsa sent at 2/14/2025  8:15 AM CST -----  Regarding: Needs return call  Type: Needs Medical Advice  Who Called:  Pt    Best Call Back Number: [phone]    Additional Information: Pt needs the office to call her regarding getting colonoscopy scheduled. Dr Scruggs was wanting her to get it done. She was told to call the office is she hasn't heard from the office yet, seeing If dr scruggs sent the referral yet

## 2025-02-14 NOTE — TELEPHONE ENCOUNTER
Spoke with pt she stated Dr. Roberts sent a referral for her to have a colonoscopy. Asked if this was for screening purposes, and she stated she had a positive cologuard. I informed her that we did not have those results, but I would contact his office to get them faxed over. I informed her that once I have those results I will contact her to get her colonoscopy scheduled. She verbalized understanding. No further issues noted.

## 2025-03-05 ENCOUNTER — TELEPHONE (OUTPATIENT)
Dept: GASTROENTEROLOGY | Facility: CLINIC | Age: 55
End: 2025-03-05
Payer: MEDICAID

## 2025-03-05 DIAGNOSIS — R19.5 POSITIVE COLORECTAL CANCER SCREENING USING COLOGUARD TEST: Primary | ICD-10-CM

## 2025-03-05 NOTE — TELEPHONE ENCOUNTER
----- Message from Sylvia sent at 3/5/2025  3:32 PM CST -----  Contact: pt @ [phone]  Armnida Cooper calling regarding Patient Advice (message) for #pt is calling to see if pt have receive referral to get np appt for colonoscopy , asking for call back

## 2025-03-18 ENCOUNTER — HOSPITAL ENCOUNTER (OUTPATIENT)
Facility: HOSPITAL | Age: 55
Discharge: HOME | End: 2025-03-18
Attending: INTERNAL MEDICINE | Admitting: INTERNAL MEDICINE
Payer: MEDICAID

## 2025-03-18 ENCOUNTER — ANESTHESIA EVENT (OUTPATIENT)
Dept: ENDOSCOPY | Facility: HOSPITAL | Age: 55
End: 2025-03-18
Payer: MEDICAID

## 2025-03-18 ENCOUNTER — ANESTHESIA (OUTPATIENT)
Dept: ENDOSCOPY | Facility: HOSPITAL | Age: 55
End: 2025-03-18
Payer: MEDICAID

## 2025-03-18 DIAGNOSIS — R19.5 POSITIVE COLORECTAL CANCER SCREENING USING COLOGUARD TEST: ICD-10-CM

## 2025-03-18 PROCEDURE — 63600175 PHARM REV CODE 636 W HCPCS: Performed by: NURSE ANESTHETIST, CERTIFIED REGISTERED

## 2025-03-18 PROCEDURE — 37000009 HC ANESTHESIA EA ADD 15 MINS: Performed by: INTERNAL MEDICINE

## 2025-03-18 PROCEDURE — 37000008 HC ANESTHESIA 1ST 15 MINUTES: Performed by: INTERNAL MEDICINE

## 2025-03-18 PROCEDURE — 45378 DIAGNOSTIC COLONOSCOPY: CPT | Mod: ,,, | Performed by: INTERNAL MEDICINE

## 2025-03-18 PROCEDURE — 25000003 PHARM REV CODE 250: Performed by: INTERNAL MEDICINE

## 2025-03-18 PROCEDURE — 45378 DIAGNOSTIC COLONOSCOPY: CPT | Performed by: INTERNAL MEDICINE

## 2025-03-18 RX ORDER — LIDOCAINE HYDROCHLORIDE 20 MG/ML
INJECTION INTRAVENOUS
Status: DISCONTINUED | OUTPATIENT
Start: 2025-03-18 | End: 2025-03-18

## 2025-03-18 RX ORDER — SODIUM CHLORIDE 9 MG/ML
INJECTION, SOLUTION INTRAVENOUS CONTINUOUS
Status: DISCONTINUED | OUTPATIENT
Start: 2025-03-18 | End: 2025-03-18 | Stop reason: HOSPADM

## 2025-03-18 RX ORDER — PROPOFOL 10 MG/ML
VIAL (ML) INTRAVENOUS
Status: DISCONTINUED | OUTPATIENT
Start: 2025-03-18 | End: 2025-03-18

## 2025-03-18 RX ADMIN — SODIUM CHLORIDE: 9 INJECTION, SOLUTION INTRAVENOUS at 08:03

## 2025-03-18 RX ADMIN — PROPOFOL 30 MG: 10 INJECTION, EMULSION INTRAVENOUS at 09:03

## 2025-03-18 RX ADMIN — PROPOFOL 100 MG: 10 INJECTION, EMULSION INTRAVENOUS at 09:03

## 2025-03-18 RX ADMIN — LIDOCAINE HYDROCHLORIDE 75 MG: 20 INJECTION, SOLUTION INTRAVENOUS at 09:03

## 2025-03-18 NOTE — TRANSFER OF CARE
Anesthesia Transfer of Care Note    Patient: Arminda Cooper    Procedure(s) Performed: Procedure(s) (LRB):  COLONOSCOPY, SCREENING, HIGH RISK PATIENT (N/A)    Patient location: PACU    Anesthesia Type: general    Transport from OR: Transported from OR on room air with adequate spontaneous ventilation    Post pain: adequate analgesia    Post assessment: no apparent anesthetic complications and tolerated procedure well    Post vital signs: stable    Level of consciousness: sedated    Nausea/Vomiting: no nausea/vomiting    Complications: none    Transfer of care protocol was followed    Last vitals: Visit Vitals  /69 (BP Location: Left arm, Patient Position: Lying)   Pulse 76   Temp 36.7 °C (98.1 °F) (Skin)   Resp 16   Ht 5' (1.524 m)   Wt 72.6 kg (160 lb)   SpO2 99%   Breastfeeding No   BMI 31.25 kg/m²

## 2025-03-18 NOTE — ANESTHESIA PREPROCEDURE EVALUATION
03/18/2025  Arminda Cooper is a 54 y.o., female.      Pre-op Assessment    I have reviewed the Patient Summary Reports.     I have reviewed the Nursing Notes. I have reviewed the NPO Status.   I have reviewed the Medications.     Review of Systems  Cardiovascular:  Cardiovascular Normal                                              Pulmonary:  Pulmonary Normal                       Renal/:  Renal/ Normal                 Hepatic/GI:  Bowel Prep.       Taking GLP-1 Agonists            Neurological:    Neuromuscular Disease,       Fibromyalgia                             Endocrine:        Obesity / BMI > 30      Physical Exam  General: Well nourished    Airway:  Mallampati: II   Neck ROM: Normal ROM    Dental:  Intact        Anesthesia Plan  Type of Anesthesia, risks & benefits discussed:    Anesthesia Type: Gen Natural Airway  Intra-op Monitoring Plan: Standard ASA Monitors  Induction:  IV  Informed Consent: Informed consent signed with the Patient and all parties understand the risks and agree with anesthesia plan.  All questions answered.   ASA Score: 2    Ready For Surgery From Anesthesia Perspective.     .

## 2025-03-18 NOTE — ANESTHESIA POSTPROCEDURE EVALUATION
Anesthesia Post Evaluation    Patient: Arminda Cooper    Procedure(s) Performed: Procedure(s) (LRB):  COLONOSCOPY, SCREENING, HIGH RISK PATIENT (N/A)    Final Anesthesia Type: general      Patient location during evaluation: PACU  Patient participation: Yes- Able to Participate  Level of consciousness: sedated and awake  Post-procedure vital signs: reviewed and stable  Pain management: adequate  Airway patency: patent    PONV status at discharge: No PONV  Anesthetic complications: no      Cardiovascular status: blood pressure returned to baseline and hemodynamically stable  Respiratory status: spontaneous ventilation  Hydration status: euvolemic  Follow-up not needed.              Vitals Value Taken Time   BP 97/58 03/18/25 09:56   Temp 36.6 °C (97.9 °F) 03/18/25 09:51   Pulse 71 03/18/25 09:56   Resp 15 03/18/25 09:56   SpO2 98 % 03/18/25 09:56         Event Time   Out of Recovery 10:10:48         Pain/Madison Score: Madison Score: 10 (3/18/2025  9:54 AM)

## 2025-03-18 NOTE — [employer]OVATION PATIENT INSTRUCTIONS
Discharge Summary/Instructions after an Endoscopic Procedure  Patient Name: Arminda Cooper  Patient MRN: [id number]  Patient YOB: 1970 Tuesday, March 18, 2025  Billie Dye MD  Dear patient,  As a result of recent federal legislation (The Federal Cures Act), you may   receive lab or pathology results from your procedure in your [facility]   account before your physician is able to contact you. Your physician or   their representative will relay the results to you with their   recommendations at their soonest availability.  Thank you,  RESTRICTIONS:  During your procedure today, you received medications for sedation.  These   medications may affect your judgment, balance and coordination.  Therefore,   for 24 hours, you have the following restrictions:   - DO NOT drive a car, operate machinery, make legal/financial decisions,   sign important papers or drink alcohol.    ACTIVITY:  Today: no heavy lifting, straining or running due to procedural   sedation/anesthesia.  The following day: return to full activity including work.  DIET:  Eat and drink normally unless instructed otherwise.     TREATMENT FOR COMMON SIDE EFFECTS:  - Mild abdominal pain, nausea, belching, bloating or excessive gas:  rest,   eat lightly and use a heating pad.  - Sore Throat: treat with throat lozenges and/or gargle with warm salt   water.  - Because air was used during the procedure, expelling large amounts of air   from your rectum or belching is normal.  - If a bowel prep was taken, you may not have a bowel movement for 1-3 days.    This is normal.  SYMPTOMS TO WATCH FOR AND REPORT TO YOUR PHYSICIAN:  1. Abdominal pain or bloating, other than gas cramps.  2. Chest pain.  3. Back pain.  4. Signs of infection such as: chills or fever occurring within 24 hours   after the procedure.  5. Rectal bleeding, which would show as bright red, maroon, or black stools.   (A tablespoon of blood from the rectum is not serious,  especially if   hemorrhoids are present.)  6. Vomiting.  7. Weakness or dizziness.  GO DIRECTLY TO THE NEAREST EMERGENCY ROOM IF YOU HAVE ANY OF THE FOLLOWING:      Difficulty breathing              Chills and/or fever over 101 F   Persistent vomiting and/or vomiting blood   Severe abdominal pain   Severe chest pain   Black, tarry stools   Bleeding- more than one tablespoon   Any other symptom or condition that you feel may need urgent attention  Your doctor recommends these additional instructions:  If any biopsies were taken, your doctors clinic will contact you in 1 to 2   weeks with any results.  - Discharge patient to home (with escort).   - Patient has a contact number available for emergencies.  The signs and   symptoms of potential delayed complications were discussed with the   patient.  Return to normal activities tomorrow.  Written discharge   instructions were provided to the patient.   - Resume previous diet.   - Continue present medications.   - Repeat colonoscopy in 10 years for screening purposes.   - Return to my office PRN.  For questions, problems or results please call your physician - Billie Dye MD at Work:  [phone].  [facility], EMERGENCY ROOM PHONE NUMBER: [phone]  IF A COMPLICATION OR EMERGENCY SITUATION ARISES AND YOU ARE UNABLE TO REACH   YOUR PHYSICIAN - GO DIRECTLY TO THE EMERGENCY ROOM.  Billie Dye MD  3/18/2025 9:40:39 AM  This report has been verified and signed electronically.  Dear patient,  As a result of recent federal legislation (The Federal Cures Act), you may   receive lab or pathology results from your procedure in your [facility]   account before your physician is able to contact you. Your physician or   their representative will relay the results to you with their   recommendations at their soonest availability.  Thank you,  [employer]OVATION

## 2025-03-18 NOTE — H&P
[facility] Note    CC: + Cologuard     HPI 54 y.o. female presents for colonsocopy    Past Medical History:   Diagnosis Date    Fibromyalgia     H/O shortness of breath     Sleep apnea     Thyroid disease     Vaginal bleeding        Allergies and Medications reviewed     Review of Systems  General ROS: negative for - chills, fever or weight loss  Cardiovascular ROS: no chest pain or dyspnea on exertion  Gastrointestinal ROS: no blood in stool    Physical Examination  /69 (BP Location: Left arm, Patient Position: Lying)   Pulse 76   Temp 98.1 °F (36.7 °C) (Skin)   Resp 16   Ht 5' (1.524 m)   Wt 72.6 kg (160 lb)   SpO2 99%   Breastfeeding No   BMI 31.25 kg/m²   General appearance: alert, cooperative, no distress  HENT: Normocephalic, atraumatic, neck symmetrical, no nasal discharge, sclera anicteric   Lungs: clear to auscultation bilaterally, symmetric chest wall expansion bilaterally  Heart: regular rate and rhythm without rub; no displacement of the PMI   Abdomen: soft  Extremities: extremities symmetric; no clubbing, cyanosis, or edema        Labs:  Lab Results   Component Value Date    WBC 12.25 01/15/2020    HGB 15.4 01/15/2020    HCT 45.7 01/15/2020    MCV 95 01/15/2020     01/15/2020           Assessment:   54 y.o. female presents for colonsocopy    Plan:  Proceed to colonoscopy     Billie Dye MD  [facility]  [address], [address]  [place] [address]  Office: [phone]  Fax: [phone]

## 2025-03-18 NOTE — PLAN OF CARE
Vss, rohan po fluids, denies pain, ambulates easily. IV removed, catheter intact. Discharge instructions provided and states understanding. States ready to go home.  Discharged from facility with family per wheelchair.

## 2025-03-19 VITALS
OXYGEN SATURATION: 98 % | HEIGHT: 60 IN | HEART RATE: 71 BPM | TEMPERATURE: 98 F | WEIGHT: 160 LBS | RESPIRATION RATE: 15 BRPM | SYSTOLIC BLOOD PRESSURE: 97 MMHG | BODY MASS INDEX: 31.41 KG/M2 | DIASTOLIC BLOOD PRESSURE: 58 MMHG